# Patient Record
Sex: FEMALE | Race: WHITE | NOT HISPANIC OR LATINO | Employment: FULL TIME | URBAN - METROPOLITAN AREA
[De-identification: names, ages, dates, MRNs, and addresses within clinical notes are randomized per-mention and may not be internally consistent; named-entity substitution may affect disease eponyms.]

---

## 2018-10-25 ENCOUNTER — HISTORICAL (OUTPATIENT)
Dept: RADIOLOGY | Facility: HOSPITAL | Age: 38
End: 2018-10-25

## 2022-04-07 ENCOUNTER — HISTORICAL (OUTPATIENT)
Dept: ADMINISTRATIVE | Facility: HOSPITAL | Age: 42
End: 2022-04-07

## 2022-04-24 VITALS — SYSTOLIC BLOOD PRESSURE: 136 MMHG | DIASTOLIC BLOOD PRESSURE: 83 MMHG

## 2023-01-26 DIAGNOSIS — J32.8 OTHER CHRONIC SINUSITIS: Primary | ICD-10-CM

## 2023-02-16 ENCOUNTER — HOSPITAL ENCOUNTER (OUTPATIENT)
Dept: RADIOLOGY | Facility: HOSPITAL | Age: 43
Discharge: HOME | End: 2023-02-16
Attending: OTOLARYNGOLOGY
Payer: MEDICAID

## 2023-02-16 DIAGNOSIS — J32.8 OTHER CHRONIC SINUSITIS: ICD-10-CM

## 2023-02-16 PROCEDURE — 70486 CT MAXILLOFACIAL W/O DYE: CPT | Mod: TC

## 2023-05-29 DIAGNOSIS — L40.50 ARTHRITIS WITH PSORIASIS: Primary | ICD-10-CM

## 2024-03-19 NOTE — PROGRESS NOTES
"  Patient ID: [id number]     Chief Complaint: psoriasis arthritis (Pt stated generalized joint pain)      Referred By: Paper Order     HPI:     Ena Mallory is a 43 y.o. female here today for a new patient evaluation of PsA.      Presents today for evaluation of PsA, referred by her PCP. Initially diagnosed with Psoriasis in 2002, after sinus surgery, she reports she was having a rash after her surgery and diagnosed after a bx at this time. She has not seen Derm in several years but most recently has been followed by Dr. Allen- last seen roughly 1 year ago. She reports he was managing her skin with topical agents but has never taken any oral or injection medications. She was given Gabapentin, Ultram and Mobic. She has been on Savella in the past as well for Fibro but could not afford to continue to take. She reports her last skin flare was roughly a few weeks ago, lower back and upper buttocks, states this is the first time her skin flares here- previously has been her feet/toes/knees and elbows- She was using Betamethasone topically and did help. She reports a lot of joint pain, mostly her hands/fingers, her knees (L>R)- has had previously arthroscopy on this knee but has continued pain and her left hip is also painful. She reports most recently she has been having more lower back pain, having a lot of pain "locking up" on her. She denies any back injury in the past. She cannot sleep on her back, has to sleep on her right side, occ wakes her up at times. She reports back pain worse with activity. 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 She has taken Ultram up to 4 times a day and is helpful, she states she only takes it once a week, only if she really needs it- not taking it qid.   She reports her knee will get red/warm/swollen at times, she also feels her hip will swell, hands and fingers will also swell. States left side is worse than right side. She " "does report dactylitis in her fingers and toes at times, last occurred a few months ago. Painful to wear shoes when occurs. She does have pain and swelling behind her ankle. Morning stiffness lasting for roughly 1-2 hours. She reports she was getting steroid injections in her left knee, did not help much, relief was very short lived and has not completed in several years. She was previously followed by Ortho in 2009 here at [facility] but cannot recall who and has not seen since.   She reports skin sensitivity when she goes out in the sun- will get sun burned easily but not worsening of rashes, also reports eyes are very sensitive when she goes in the sun. She does report oral ulcers in the past, last occurred a few weeks ago, very painful and has a hard time eating, denies any vaginal or nasal ulcerations, does not occur often. She also has a history of GI issues, has a lot of reflux and nausea, possibly related to NSAID use, takes Prilosec and a Probiotic daily and this helps- is not followed by GI.   She also reports she is really not sure what her "true diagnosis is", knows she was diagnosed with Psoriasis in the past and has continued to have joint pain, states she was originally sent to Rheumatology as they thought she had vasculitis but was ruled out- will try to request records. She states she did have a discussion with Dr. Allen in the past about starting oral meds but never did.     Dr. Allen- Rheum in the past, last seen 1 year ago  [facility]   Dr. Arnold- Cardiology- previously followed by Dr. 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   Family history of autoimmune disease: None.   Pregnancies: 2 Miscarriages: None.   Smoking: Quit 2019, hx of social smoker, started around late 20's- did not smoke " daily     Social History     Tobacco Use   Smoking Status Former    Types: Cigarettes   Smokeless Tobacco Former        ----------------------------  Allergy     Past Surgical History:   Procedure Laterality Date     SECTION      KNEE ARTHROSCOPY W/ ARTHROTOMY Left 2009    SINUS SURGERY  2002    THYROIDECTOMY Left 2012       Review of patient's allergies indicates:   Allergen Reactions    Sulfa (sulfonamide antibiotics) Hives    Adhesive tape-silicones Dermatitis       Current Outpatient Medications   Medication Instructions    buPROPion (WELLBUTRIN SR) 150 mg, Oral, 2 times daily    famotidine (PEPCID) 40 mg, Oral, Daily    gabapentin (NEURONTIN) 600 mg, Oral, 3 times daily    meloxicam (MOBIC) 15 mg, Oral, Daily    metoclopramide HCl (REGLAN) 5 mg, Oral, Before meals & nightly    metoprolol tartrate (LOPRESSOR) 25 mg, Oral, Daily    norethindrone-ethinyl estradiol (JUNEL FE ) 1 mg-20 mcg (21)/75 mg (7) per tablet 1 tablet, Oral, Daily    omeprazole (PRILOSEC) 40 mg, Oral, Daily    traMADoL (ULTRAM) 50 mg, Oral, Every 6 hours       Social History     Socioeconomic History    Marital status:    Tobacco Use    Smoking status: Former     Types: Cigarettes    Smokeless tobacco: Former   Substance and Sexual Activity    Alcohol use: Yes     Comment: occ    Drug use: Not Currently    Sexual activity: Not Currently        Family History   Problem Relation Age of Onset    Chronic back pain Mother     Asthma Mother     Hypertension Father     Heart disease Father     Diabetes Mellitus Father     Diabetes Mellitus Brother     Hypertension Maternal Grandmother     Depression Maternal Grandmother     Cancer Maternal Grandmother     COPD Maternal Grandfather     Cancer Paternal Grandmother         Immunization History   Administered Date(s) Administered    COVID-19 Vaccine 2022    Influenza - Quadrivalent - PF *Preferred* (6 months and older) 2013    Pneumococcal Conjugate - 7 Valent 2013  "   Tdap 11/27/2013       Patient Care Team:  Davi Reyes MD as PCP - General (Family Medicine)     Subjective:     ROS    Constitutional:  Denies chills. Denies fever. Admits occ night sweats. Denies weight loss. Denies sleep disturbance.   Ophthalmology: Denies blurred vision. Denies diminished visual acuity. Admits dry eyes. Denies eye pain. Admits Burning and redness. Denies itching   ENT: Denies decreased hearing. Admits oral ulcers. Denies epistaxis. Denies swelling of ears or throat. Denies dry mouth. Denies swollen glands.   Endocrine: Denies diabetes. Admits thyroid Problems.   Respiratory: Denies cough. Denies shortness of breath. Denies shortness of breath with exertion. Denies hemoptysis.   Cardiovascular: Denies chest pain at rest. Denies chest pain with exertion. Denies Irregular heartbeat. Admits palpitations- hx of PACs- follows with Dr. Arnold. Denies edema. Denies orthopnea.   Gastrointestinal: Denies abdominal pain. Denies diarrhea. Denies nausea. Denies vomiting. Denies hematemesis or hematochezia. Denies change in appetite. Denies heartburn.  Genitourinary: Denies dysuria. Denies urinary frequency. Denies urinary urgency. Denies blood in urine.  Musculoskeletal: See HPI for details  Integumentary: Admits rash. Denies Itching. Denies dry skin. Denies photosensitivity.   Peripheral Vascular: Denies Ulcers of hands and/or feet. Denies Cold extremities.   Neurologic: Denies dizziness. Admits headache. Denies difficulty speaking. Denies loss of strength Denies numbness or tingling.   Psychiatric: Denies depression and anxiety- currently controlled with meds. Denies suicidal/homicidal ideations.      Objective:     Visit Vitals  /80 (BP Location: Right arm, Patient Position: Sitting, BP Method: Medium (Automatic))   Pulse 68   Temp 98.8 °F (37.1 °C) (Oral)   Resp 20   Ht 4' 11" (1.499 m)   Wt 65.5 kg (144 lb 6.4 oz)   SpO2 97%   BMI 29.17 kg/m²       Physical Exam    General Appearance: " "alert, pleasant, in no acute distress.  Skin: Skin color, texture, turgor normal. No rashes or lesions. Spider veins noted to bilateral lower extremities.   Eyes:  extraocular movement intact (EOMI), pupils equal, round, reactive to light and accommodation, conjunctiva clear.  ENT: No oral or nasal ulcers.  Neck:  Neck supple. No adenopathy.   Lungs: CTA bilaterally without crackles, rhonchi, or wheezes.   Heart: RRR w/o murmurs.  No edema. 2+ DP pulse.  Abdomen: Soft, non-tender, no masses, rebound or guarding.  Neuro: Alert, oriented, CN II-XII GI, sensory and motor innervation intact.  Musculoskeletal: Mild synovitis noted to bilateral 2nd/3rd MCPs and several PIPs, FROM with no pain to bilateral wrists, elbows and shoulders, FROM noted to knees with crepitus noted bilaterally (L>R), no pain to bilateral ankles or MTPs, unable to assess nail beds as her nails and toe nails are painted, no dactylitis or enthesitis noted,- SLE bilaterally, tenderness with Alisa on left, right ok, FROM noted to spine  Psych: Alert, oriented, normal eye contact.       Labs Reviewed:     Rheumatology:  No results found for: "ANAHS", "ANATIT", "ANAPAT", "C3", "C4", "DNADSAB", "CCPANTIBODIE", "[id number]", "INTER", "HSCRP", "SEDRATE", "LABURIC"     Chemistry:  No results found for: "NA", "K", "CHLORIDE", "BUN", "CREATININE", "EGFRNORACEVR", "GLUCOSE", "CALCIUM", "ALKPHOS", "LABPROT", "ALBUMIN", "BILIDIR", "IBILI", "AST", "ALT", "MG", "PHOS", "[id number]"     Hematology:  No results found for: "WBC", "HGB", "HCT", "PLT", "NEUTRPER"     Urine:  No results found for: "COLORUA", "APPEARANCEUA", "SGUA", "PHUA", "PROTEINUA", "GLUCOSEUA", "KETONESUA", "BLOODUA", "NITRITESUA", "LEUKOCYTESUR", "RBCUA", "WBCUA", "BACTERIA", "SQEPUA", "HYALINECASTS", "CREATRANDUR", "PROTEINURINE", "UPROTCREA"     No results found for: "PROTEINURINE", "CREATRANDUR", "UPROTCREA"     Imaging:       Assessment:       ICD-10-CM ICD-9-CM   1. Psoriasis  L40.9 696.1   2. " Psoriatic arthritis  L40.50 696.0   3. Bilateral hip pain  M25.551 719.45    M25.552    4. Bilateral foot pain  M79.671 729.5    M79.672    5. Chronic pain of both knees  M25.561 719.46    M25.562 338.29    G89.29    6. Chronic midline low back pain with left-sided sciatica  M54.42 724.2    G89.29 724.3     338.29   7. Bilateral hand pain  M79.641 729.5    M79.642         Plan:     1. Psoriasis/Psoriatic arthritis  Referral here today for evaluation of PsA- initially diagnosed with Psoriasis in 2002 after skin biopsy- was developing rashes after sinus surgery and bx confirmed Psoriasis- she has not seen Derm in several years and was referred to Rheumatology Dr. Allen- has not seen in the past 1 year. She denies taking any meds for Psoriasis besides topicals, no oral or injections. She reports she was being managed with Ultram and Mobic. Today she continues to have pain to bilateral hands, knees, hips and feet- last rash occurred a few weeks ago, first time occurred in lower back/upper buttocks area, previously on knees, feet and elbows- rash of feet noted on phone and Psoriatic in nature. She does endorse a history of Dactylitis and Enthesitis with red/warm/swollen joints and morning stiffness lasting at least 1 hour. Today on exam, mild synovitis noted to 2nd/3rd MCPs bilaterally and several PIPs, no dactylitis or enthesitis noted on exam. No active rashes noted.   - Discussed at length- will request last OV note from Dr. Allen for review  - Further lab and Xrays today for evaluation  - Discussed treatment options including MTX however she is currently on oral birth control- discussed possibly considering Depo or IUD as she should consider stopping oral OCP due to her age and increased risk for stroke- she will reach out to her GYN to discuss further- we can also consider Humira pending lab results  - Infection w/u ordered today  - Ok to continue Mobic as directed however should monitor intake with history of  reflux- also advised that we do not prescribe controlled medication here- she verbalized understanding.   - At length discussion in regards to starting treatment- will request and review last OV note from Dr. Allen as she is unsure of all of her history when she was being followed by him- will review records along with lab and Xrays ordered today    2.  Bilateral hip pain  - Xrays today for further evaluation      4. Bilateral foot pain  - Xrays today for further evaluation     5. Chronic pain of both knees  - Xrays today for further evaluation, possible referral to Ortho in the future     6. Chronic midline low back pain with left-sided sciatica  -  Xrays today for further evaluation     7. Bilateral hand pain  - Xrays today for further evaluation     8. Fibromyalgia  - No issues noted at this time   - She was advised that we do no manage Fibro here, that we will continue to defer treatment to PCP       Follow up in about 8 weeks (around 5/20/2024) for NP Follow Up. In addition to their scheduled follow up, the patient has also been instructed to follow up on as needed basis.       Total time spent with patient and documentation is 75 minutes. All questions were answered to patient's satisfaction and patient verbalized understanding.  Today's visit included increased complexity associated with the care of episodic problem of Psoriasis and PsA addressed and managing the longitudinal care of the patient due to the series and or complex managed problem(s).       CHAUNCEY Oliveros    Orders Placed This Encounter   Procedures    X-Ray Knee 1 or 2 View Left     Standing Status:   Future     Standing Expiration Date:   3/25/2025     Order Specific Question:   May the Radiologist modify the order per protocol to meet the clinical needs of the patient?     Answer:   Yes     Order Specific Question:   Release to patient     Answer:   Immediate    X-Ray Knee 1 or 2 View Right     Standing Status:   Future      Standing Expiration Date:   3/25/2025     Order Specific Question:   May the Radiologist modify the order per protocol to meet the clinical needs of the patient?     Answer:   Yes     Order Specific Question:   Release to patient     Answer:   Immediate    X-Ray Hip 2 or 3 views Left (with Pelvis when performed)     Standing Status:   Future     Standing Expiration Date:   3/25/2025     Order Specific Question:   May the Radiologist modify the order per protocol to meet the clinical needs of the patient?     Answer:   Yes     Order Specific Question:   Release to patient     Answer:   Immediate    X-Ray Hip 2 or 3 views Right (with Pelvis when performed)     Standing Status:   Future     Standing Expiration Date:   3/25/2025     Order Specific Question:   May the Radiologist modify the order per protocol to meet the clinical needs of the patient?     Answer:   Yes     Order Specific Question:   Release to patient     Answer:   Immediate    X-Ray Lumbar Complete Including Flex And Ext     Standing Status:   Future     Standing Expiration Date:   3/25/2025     Order Specific Question:   May the Radiologist modify the order per protocol to meet the clinical needs of the patient?     Answer:   Yes    X-Ray Sacroiliac Joints 3 Views     Standing Status:   Future     Standing Expiration Date:   3/25/2025     Order Specific Question:   May the Radiologist modify the order per protocol to meet the clinical needs of the patient?     Answer:   Yes     Order Specific Question:   Release to patient     Answer:   Immediate    X-Ray Hand 3 view Left     Standing Status:   Future     Standing Expiration Date:   3/25/2025     Order Specific Question:   May the Radiologist modify the order per protocol to meet the clinical needs of the patient?     Answer:   Yes     Order Specific Question:   Release to patient     Answer:   Immediate    X-Ray Hand 3 view Right     Standing Status:   Future     Standing Expiration Date:   3/25/2025      Order Specific Question:   May the Radiologist modify the order per protocol to meet the clinical needs of the patient?     Answer:   Yes     Order Specific Question:   Release to patient     Answer:   Immediate    X-Ray Foot Complete Right     Standing Status:   Future     Standing Expiration Date:   3/25/2025     Order Specific Question:   May the Radiologist modify the order per protocol to meet the clinical needs of the patient?     Answer:   Yes     Order Specific Question:   Release to patient     Answer:   Immediate    X-Ray Foot Complete Left     Standing Status:   Future     Standing Expiration Date:   3/25/2025     Order Specific Question:   May the Radiologist modify the order per protocol to meet the clinical needs of the patient?     Answer:   Yes     Order Specific Question:   Release to patient     Answer:   Immediate    X-Ray Chest PA And Lateral     Standing Status:   Future     Standing Expiration Date:   3/25/2025     Order Specific Question:   May the Radiologist modify the order per protocol to meet the clinical needs of the patient?     Answer:   Yes     Order Specific Question:   Release to patient     Answer:   Immediate    CBC Auto Differential     Standing Status:   Future     Number of Occurrences:   1     Standing Expiration Date:   5/24/2025    Comprehensive Metabolic Panel     Standing Status:   Future     Number of Occurrences:   1     Standing Expiration Date:   5/24/2025    C-Reactive Protein     Standing Status:   Future     Number of Occurrences:   1     Standing Expiration Date:   5/24/2025    Sedimentation rate     Standing Status:   Future     Number of Occurrences:   1     Standing Expiration Date:   5/24/2025    HLA B27 Antigen     Standing Status:   Future     Number of Occurrences:   1     Standing Expiration Date:   5/24/2025    SANTA IgG by IFA     Standing Status:   Future     Number of Occurrences:   1     Standing Expiration Date:   5/24/2025    Rheumatoid Factors,  IgA, IgG, IgM     Standing Status:   Future     Number of Occurrences:   1     Standing Expiration Date:   5/25/2025    Miscellaneous Test, Sendout Anti CCP, please send to [facility] for quantitative result.     Standing Status:   Future     Number of Occurrences:   1     Standing Expiration Date:   5/25/2025     Order Specific Question:   What is the name of the test you wish to perform? (Note: Please provide the reference lab test code if possible. If you have any questions, call the Lab.)     Answer:   Anti CCP, please send to [facility] for quantitative result.    HIV 1/2 Ag/Ab (4th Gen)     Standing Status:   Future     Number of Occurrences:   1     Standing Expiration Date:   5/24/2025     Order Specific Question:   Release to patient     Answer:   Immediate    Quantiferon Gold TB     Standing Status:   Future     Number of Occurrences:   1     Standing Expiration Date:   5/24/2025    Hepatitis C Antibody     Standing Status:   Future     Number of Occurrences:   1     Standing Expiration Date:   5/24/2025     Order Specific Question:   Release to patient     Answer:   Immediate    Hepatitis B Surface Antigen     Standing Status:   Future     Number of Occurrences:   1     Standing Expiration Date:   5/24/2025    Hepatitis B Core Antibody, Total     Standing Status:   Future     Number of Occurrences:   1     Standing Expiration Date:   5/24/2025    Hepatitis B Surface Ab, Qualitative     Standing Status:   Future     Number of Occurrences:   1     Standing Expiration Date:   5/24/2025

## 2024-03-25 ENCOUNTER — HOSPITAL ENCOUNTER (OUTPATIENT)
Dept: RADIOLOGY | Facility: HOSPITAL | Age: 44
Discharge: HOME | End: 2024-03-25
Attending: NURSE PRACTITIONER
Payer: MEDICAID

## 2024-03-25 ENCOUNTER — TELEPHONE (OUTPATIENT)
Dept: RHEUMATOLOGY | Facility: CLINIC | Age: 44
End: 2024-03-25

## 2024-03-25 ENCOUNTER — OFFICE VISIT (OUTPATIENT)
Dept: RHEUMATOLOGY | Facility: CLINIC | Age: 44
End: 2024-03-25
Payer: MEDICAID

## 2024-03-25 VITALS
SYSTOLIC BLOOD PRESSURE: 114 MMHG | RESPIRATION RATE: 20 BRPM | OXYGEN SATURATION: 97 % | HEART RATE: 68 BPM | BODY MASS INDEX: 29.11 KG/M2 | HEIGHT: 59 IN | DIASTOLIC BLOOD PRESSURE: 80 MMHG | TEMPERATURE: 99 F | WEIGHT: 144.38 LBS

## 2024-03-25 DIAGNOSIS — M79.641 BILATERAL HAND PAIN: ICD-10-CM

## 2024-03-25 DIAGNOSIS — M25.561 CHRONIC PAIN OF BOTH KNEES: ICD-10-CM

## 2024-03-25 DIAGNOSIS — M79.642 BILATERAL HAND PAIN: ICD-10-CM

## 2024-03-25 DIAGNOSIS — M79.671 BILATERAL FOOT PAIN: ICD-10-CM

## 2024-03-25 DIAGNOSIS — M25.552 BILATERAL HIP PAIN: ICD-10-CM

## 2024-03-25 DIAGNOSIS — L40.9 PSORIASIS: ICD-10-CM

## 2024-03-25 DIAGNOSIS — G89.29 CHRONIC PAIN OF BOTH KNEES: ICD-10-CM

## 2024-03-25 DIAGNOSIS — L40.9 PSORIASIS: Primary | ICD-10-CM

## 2024-03-25 DIAGNOSIS — M25.551 BILATERAL HIP PAIN: ICD-10-CM

## 2024-03-25 DIAGNOSIS — M79.672 BILATERAL FOOT PAIN: ICD-10-CM

## 2024-03-25 DIAGNOSIS — M54.42 CHRONIC MIDLINE LOW BACK PAIN WITH LEFT-SIDED SCIATICA: ICD-10-CM

## 2024-03-25 DIAGNOSIS — M25.562 CHRONIC PAIN OF BOTH KNEES: ICD-10-CM

## 2024-03-25 DIAGNOSIS — G89.29 CHRONIC MIDLINE LOW BACK PAIN WITH LEFT-SIDED SCIATICA: ICD-10-CM

## 2024-03-25 DIAGNOSIS — L40.50 PSORIATIC ARTHRITIS: ICD-10-CM

## 2024-03-25 PROBLEM — F32.A DEPRESSIVE DISORDER: Status: ACTIVE | Noted: 2024-03-25

## 2024-03-25 PROBLEM — K21.9 GASTROESOPHAGEAL REFLUX DISEASE: Status: ACTIVE | Noted: 2024-03-25

## 2024-03-25 PROBLEM — I10 HYPERTENSION: Status: ACTIVE | Noted: 2024-03-25

## 2024-03-25 PROBLEM — E03.9 HYPOTHYROIDISM: Status: ACTIVE | Noted: 2024-03-25

## 2024-03-25 PROBLEM — M79.7 FIBROMYALGIA: Status: ACTIVE | Noted: 2024-03-25

## 2024-03-25 PROBLEM — J45.909 ASTHMA: Status: ACTIVE | Noted: 2024-03-25

## 2024-03-25 PROCEDURE — 73630 X-RAY EXAM OF FOOT: CPT | Mod: TC,LT

## 2024-03-25 PROCEDURE — 71046 X-RAY EXAM CHEST 2 VIEWS: CPT | Mod: TC

## 2024-03-25 PROCEDURE — 73502 X-RAY EXAM HIP UNI 2-3 VIEWS: CPT | Mod: TC,RT

## 2024-03-25 PROCEDURE — 3008F BODY MASS INDEX DOCD: CPT | Mod: CPTII,,, | Performed by: NURSE PRACTITIONER

## 2024-03-25 PROCEDURE — 73560 X-RAY EXAM OF KNEE 1 OR 2: CPT | Mod: TC,RT

## 2024-03-25 PROCEDURE — 3079F DIAST BP 80-89 MM HG: CPT | Mod: CPTII,,, | Performed by: NURSE PRACTITIONER

## 2024-03-25 PROCEDURE — 73630 X-RAY EXAM OF FOOT: CPT | Mod: TC,RT

## 2024-03-25 PROCEDURE — 73130 X-RAY EXAM OF HAND: CPT | Mod: TC,RT

## 2024-03-25 PROCEDURE — 72114 X-RAY EXAM L-S SPINE BENDING: CPT | Mod: TC

## 2024-03-25 PROCEDURE — 73502 X-RAY EXAM HIP UNI 2-3 VIEWS: CPT | Mod: TC,LT

## 2024-03-25 PROCEDURE — 73560 X-RAY EXAM OF KNEE 1 OR 2: CPT | Mod: TC,LT

## 2024-03-25 PROCEDURE — 99215 OFFICE O/P EST HI 40 MIN: CPT | Mod: S$PBB,,, | Performed by: NURSE PRACTITIONER

## 2024-03-25 PROCEDURE — 99215 OFFICE O/P EST HI 40 MIN: CPT | Mod: PBBFAC,25 | Performed by: NURSE PRACTITIONER

## 2024-03-25 PROCEDURE — 1159F MED LIST DOCD IN RCRD: CPT | Mod: CPTII,,, | Performed by: NURSE PRACTITIONER

## 2024-03-25 PROCEDURE — 3074F SYST BP LT 130 MM HG: CPT | Mod: CPTII,,, | Performed by: NURSE PRACTITIONER

## 2024-03-25 PROCEDURE — 73130 X-RAY EXAM OF HAND: CPT | Mod: TC,LT

## 2024-03-25 PROCEDURE — G2211 COMPLEX E/M VISIT ADD ON: HCPCS | Mod: S$PBB,,, | Performed by: NURSE PRACTITIONER

## 2024-03-25 PROCEDURE — 1160F RVW MEDS BY RX/DR IN RCRD: CPT | Mod: CPTII,,, | Performed by: NURSE PRACTITIONER

## 2024-03-25 PROCEDURE — 72200 X-RAY EXAM SI JOINTS: CPT | Mod: TC

## 2024-03-25 RX ORDER — TRAMADOL HYDROCHLORIDE 50 MG/1
50 TABLET ORAL EVERY 6 HOURS
COMMUNITY

## 2024-03-25 RX ORDER — METOPROLOL TARTRATE 25 MG/1
25 TABLET, FILM COATED ORAL DAILY
COMMUNITY

## 2024-03-25 RX ORDER — BUPROPION HYDROCHLORIDE 150 MG/1
150 TABLET, EXTENDED RELEASE ORAL 2 TIMES DAILY
COMMUNITY

## 2024-03-25 RX ORDER — OMEPRAZOLE 40 MG/1
40 CAPSULE, DELAYED RELEASE ORAL DAILY
COMMUNITY

## 2024-03-25 RX ORDER — GABAPENTIN 600 MG/1
600 TABLET ORAL 3 TIMES DAILY
COMMUNITY

## 2024-03-25 RX ORDER — METOCLOPRAMIDE 5 MG/1
5 TABLET ORAL
COMMUNITY

## 2024-03-25 RX ORDER — NORETHINDRONE ACETATE AND ETHINYL ESTRADIOL 1MG-20(21)
1 KIT ORAL DAILY
COMMUNITY
End: 2024-04-30

## 2024-03-25 RX ORDER — FAMOTIDINE 40 MG/1
40 TABLET, FILM COATED ORAL DAILY
COMMUNITY

## 2024-03-25 RX ORDER — MELOXICAM 15 MG/1
15 TABLET ORAL DAILY
COMMUNITY

## 2024-03-25 NOTE — TELEPHONE ENCOUNTER
Dr Allen office faxed machine not working there office suggested I mail. Request for last office visit mailed to [address] [place] [address] [phone] jeimy

## 2024-04-01 ENCOUNTER — TELEPHONE (OUTPATIENT)
Dept: RHEUMATOLOGY | Facility: CLINIC | Age: 44
End: 2024-04-01
Payer: MEDICAID

## 2024-04-01 DIAGNOSIS — G89.29 CHRONIC MIDLINE LOW BACK PAIN WITH LEFT-SIDED SCIATICA: Primary | ICD-10-CM

## 2024-04-01 DIAGNOSIS — M54.42 CHRONIC MIDLINE LOW BACK PAIN WITH LEFT-SIDED SCIATICA: Primary | ICD-10-CM

## 2024-04-01 NOTE — TELEPHONE ENCOUNTER
4/02/24 spoke to pt inform pt of message.pt verbalized understanding.stated she opened to during PT                    I attempt to call pt no answer left a voicemail message to call clinic back regarding lab beatrices              ----- Message from CHAUNCEY Oliveros sent at 3/28/2024  7:52 AM CDT -----  Please advise the patient the following lab and Xray results: all lab clinically acceptable, SANTA/CCP and Rheumatoid factor all negative, remaining lab clinically acceptable. Xrays as follows, Chest Xray ok, left and right foot, left and right hands and knees and SI joint Xrays all with no abnormalities noted. Lspine with degenerative changes noted, in the future if back continues to bother her she can consider PT. Please let her know we are still waiting on records from Dr. Allen's office- if we can check on the status if they have sent them and keep me posted. If we do not receive records from [facility] in the next week, please let her know we will reach out to her to set up a follow apt to discuss treatment options

## 2024-04-10 ENCOUNTER — TELEPHONE (OUTPATIENT)
Dept: RHEUMATOLOGY | Facility: CLINIC | Age: 44
End: 2024-04-10
Payer: MEDICAID

## 2024-04-10 NOTE — TELEPHONE ENCOUNTER
----- Message from CHAUNCEY Oliveros sent at 4/10/2024 12:09 PM CDT -----  Ms MOLLY, can you reach out directly to Dr. Vazquez office and speak with one of the nurses to see if they can help us interpret what she was being followed for there. I'm having a hard time making out the hand written notes and did not make out anything pertaining to Rheum from what I could read- it may be in a previous note if they can provide feedback for us. Please keep me posted.   ----- Message -----  From: Samantha Celaya LPN  Sent: 4/10/2024   8:11 AM CDT  To: CHAUNCEY Oliveros

## 2024-04-11 ENCOUNTER — TELEPHONE (OUTPATIENT)
Dept: RHEUMATOLOGY | Facility: CLINIC | Age: 44
End: 2024-04-11
Payer: MEDICAID

## 2024-04-11 NOTE — TELEPHONE ENCOUNTER
Please reach out to Ena, we did receive her records from Dr. Allen's office- last OV was treating for Fibromyalgia - please find out if she has discussed with her GYN yet the change of her oral birth control to another agent- we reviewed and discussed at her recent visit-  we briefly reviewed starting MTX but as she is on OCP's so we will avoid starting as she is of child bearing age- we discussed Depo/IUD, etc. If she defers wanting to change please keep me updated- we can also consider staring Humira instead.

## 2024-04-12 NOTE — TELEPHONE ENCOUNTER
Spoke to pt inform her of message. Pt. stated she was still taking oral birth control because she has not heard from us but will get with her GYN doctor to switch to depo Instructed when she starts Depo to please call me back. Pt verbalized understanding

## 2024-04-17 ENCOUNTER — TELEPHONE (OUTPATIENT)
Dept: RHEUMATOLOGY | Facility: CLINIC | Age: 44
End: 2024-04-17
Payer: MEDICAID

## 2024-04-18 ENCOUNTER — TELEPHONE (OUTPATIENT)
Dept: RHEUMATOLOGY | Facility: CLINIC | Age: 44
End: 2024-04-18
Payer: MEDICAID

## 2024-04-18 NOTE — TELEPHONE ENCOUNTER
Pt accept apt  for April 30 th @ 7:30                    Spoke to pt informed of message. Pt verbalized understanding . Pt stated she taking her first Depo shoot this morning at [facility] to day  04/18/24                  ----- Message from CHAUNCEY Oliveros sent at 4/18/2024  7:45 AM CDT -----  Please let Ena know that she should not need an order in regards to changing her birth control- this is a discussion between her and GYN. Several factors in regards to starting MTX including the fact that it is teratogenic- oral birth control has to be taken everyday around the same time, if she misses any doses or takes antibiotics for any reason this will decrease the efficacy of the pills.  Before initiating this medication we make sure our female patients are either on Depo or have an IUD- if she defers wanting to change from oral birth control we can consider starting another medication such as Humira injections- this would be up to Ena to decide and let us know which she prefers. (MTX is taken once a week, Humira is an injection q14 days). Please keep me posted in regards to what she would like to do, otherwise her GYN should not require an order from us in regards to changing her birth control.  ----- Message -----  From: Samantha Celaya LPN  Sent: 4/18/2024   7:16 AM CDT  To: CHAUNCEY Oliveros      ----- Message -----  From: Nguyen Cabezas LPN  Sent: 4/17/2024   3:30 PM CDT  To: Samantha Celaya LPN      ----- Message -----  From: Susana Barajas  Sent: 4/17/2024   1:15 PM CDT  To: [facility] Rheumatology Clinical Support Staff    Pt called bc her OBYN Margie Woodson is needing an order that says she needs to switch Birth control to depo to start the medication methotrexate.   Fax [phone]

## 2024-04-25 NOTE — PROGRESS NOTES
"  Patient ID: [id number]     Chief Complaint: Psoriasis (Pt stated psoriasis has improved under lt foot. C/o pain to the back)    HPI:     Ena Mallory is a 43 y.o. female here today for a follow up of PsA.      Presents today for evaluation of PsA, referred by her PCP. Initially diagnosed with Psoriasis in 2002, after sinus surgery, she reports she was having a rash after her surgery and diagnosed after a bx at this time. She has not seen Derm in several years but most recently has been followed by Dr. Allen- last seen roughly 1 year ago. She reports he was managing her skin with topical agents but has never taken any oral or injection medications. She was given Gabapentin, Ultram and Mobic. She has been on Savella in the past as well for Fibro but could not afford to continue to take. She reports her last skin flare was roughly a few weeks ago, lower back and upper buttocks, states this is the first time her skin flares here- previously has been her feet/toes/knees and elbows- She was using Betamethasone topically and did help. She reports a lot of joint pain, mostly her hands/fingers, her knees (L>R)- has had previously arthroscopy on this knee but has continued pain and her left hip is also painful. She reports most recently she has been having more lower back pain, having a lot of pain "locking up" on her. She denies any back injury in the past. She cannot sleep on her back, has to sleep on her right side, occ wakes her up at times. She reports back pain worse with activity. 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 She has taken Ultram up to 4 times a day and is helpful, she states she only takes it once a week, only if she really needs it- not taking it qid.   She reports her knee will get red/warm/swollen at times, she also feels her hip will swell, hands and fingers will also swell. States left side is worse than right side. She does report " dactylitis in her fingers and toes at times, last occurred a few months ago. Painful to wear shoes when occurs. She does have pain and swelling behind her ankles. Morning stiffness lasting for roughly 1-2 hours. She reports she was getting steroid injections in her left knee, did not help much, relief was very short lived and has not completed in several years. She was previously followed by Ortho in 2009 here at [facility] and has not seen since.   She reports skin sensitivity when she goes out in the sun- will get sun burned easily but not worsening of rashes, also reports eyes are very sensitive when she goes in the sun. She does report oral ulcers in the past, last occurred a few weeks ago, very painful and has a hard time eating, denies any vaginal or nasal ulcerations, does not occur often. She also has a history of GI issues, has a lot of reflux and nausea, possibly related to NSAID use, takes Prilosec and a Probiotic daily and this helps- is not followed by GI. Denies bloody stools but does have a lot of abd bloating and gas.      April 2024: Here today for follow up. Received records from Dr. Allen- he was managing his Fibromyalgia, previous discussion in regards to oral meds for Psoriasis but never started.   Since her last visit she has d/c oral birth control and was started on Depo as we did discuss MTX as a treatment option- she is here today to discuss further. The rashes on her feet are getting better, she has been using topical and trying to keep very moisturized and improving, denies any new rashes She did have an area on her back that started to come out but started topicals quickly and controlled. She continues to have left knee pain, states will get red/warm/swollen, otherwise she has not had any other red/warm/swollen joints since her last visit. Her toes will bother her off an on during the week- does report she had an episode of dactylitis since her last visit. She continues to have back pain,  chronic, depends on her activity- just started PT. Morning stiffness lasting roughly 2 hours. She also suffers with headaches- she was previously followed by Neurology- last seen roughly 9 years ago, at this time they did discuss Biotox but she states she lost her insurance and was not able to follow back up- headaches several times a week (at least 2-3)- Excedrin Migraine does help- occ will have to take Ibuprofen after.  Does not have regular cycles anymore so not associated. Mother with history of Migraines.     Dr. Allen- Rheum in the past, last seen 1 year ago  [facility]   Dr. Arnold- Cardiology- previously followed by Dr. 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   Family history of autoimmune disease: None.   Pregnancies: 2 Miscarriages: None.   Smoking: Quit , hx of social smoker, started around late - did not smoke daily     Social History     Tobacco Use   Smoking Status Former    Types: Cigarettes   Smokeless Tobacco Former        -------------------------------------    Allergy        Past Surgical History:   Procedure Laterality Date     SECTION      KNEE ARTHROSCOPY W/ ARTHROTOMY Left     SINUS SURGERY  2002    THYROIDECTOMY Left     Umbical Hernia Repair N/A 2017       Review of patient's allergies indicates:   Allergen Reactions    Sulfa (sulfonamide antibiotics) Hives    Adhesive tape-silicones Dermatitis and Rash       Current Outpatient Medications   Medication Instructions    buPROPion (WELLBUTRIN SR) 150 mg, Oral, 2 times daily    famotidine (PEPCID) 40 mg, Oral, Daily    folic acid (FOLVITE) 1 mg, Oral, Daily, After food    gabapentin (NEURONTIN) 600 mg, Oral, 3 times daily    medroxyPROGESTERone (DEPO-PROVERA) 150 mg, Intramuscular, Every 3 months     meloxicam (MOBIC) 15 mg, Oral, Daily    methotrexate 2.5 MG Tab Take 4 tablets once a week for 2 weeks and increase dose to 6 tablets once a week. (Ok to split the dose and take 3 tab in the am and 3 tab in the pm). Hold for infection or fever.    metoclopramide HCl (REGLAN) 5 mg, Oral, Before meals & nightly    metoprolol tartrate (LOPRESSOR) 25 mg, Oral, Daily    omeprazole (PRILOSEC) 40 mg, Oral, Daily    traMADoL (ULTRAM) 50 mg, Oral, Every 6 hours       Social History     Socioeconomic History    Marital status:    Tobacco Use    Smoking status: Former     Types: Cigarettes    Smokeless tobacco: Former   Substance and Sexual Activity    Alcohol use: Yes     Comment: occ    Drug use: Not Currently    Sexual activity: Not Currently     Birth control/protection: Injection        Family History   Problem Relation Name Age of Onset    Chronic back pain Mother      Asthma Mother      Hypertension Father      Heart disease Father      Diabetes Mellitus Father      Diabetes Mellitus Brother      Hypertension Maternal Grandmother      Depression Maternal Grandmother      Cancer Maternal Grandmother      COPD Maternal Grandfather      Cancer Paternal Grandmother          Immunization History   Administered Date(s) Administered    COVID-19 Vaccine 03/12/2022    Influenza - Quadrivalent - PF *Preferred* (6 months and older) 11/27/2013    Pneumococcal Conjugate - 7 Valent 11/27/2013    Tdap 11/27/2013       Patient Care Team:  Davi Reyes MD as PCP - General (Family Medicine)     Subjective:     ROS    Constitutional:  Denies chills. Denies fever. Admits occ night sweats. Denies weight loss. Denies sleep disturbance.   Ophthalmology: Denies blurred vision. Denies diminished visual acuity. Admits dry eyes. Denies eye pain. Admits Burning and redness- not at this time- occurred in the past. Denies itching   ENT: Denies decreased hearing. Admits oral ulcers. Denies epistaxis. Denies swelling of ears or throat.  "Denies dry mouth. Denies swollen glands.   Endocrine: Denies diabetes. Admits thyroid Problems.   Respiratory: Denies cough. Denies shortness of breath. Denies shortness of breath with exertion. Denies hemoptysis.   Cardiovascular: Denies chest pain at rest. Denies chest pain with exertion. Denies Irregular heartbeat. Admits palpitations- hx of PACs- follows with Dr. Arnold. Denies edema. Denies orthopnea.   Gastrointestinal: Denies abdominal pain. Denies diarrhea. Denies nausea. Denies vomiting. Denies hematemesis or hematochezia. Denies change in appetite. Admits heartburn and bloating.   Genitourinary: Denies dysuria. Denies urinary frequency. Denies urinary urgency. Denies blood in urine.  Musculoskeletal: See HPI for details  Integumentary: Admits rash. Denies Itching. Denies dry skin. Denies photosensitivity.   Peripheral Vascular: Denies Ulcers of hands and/or feet. Denies Cold extremities.   Neurologic: Denies dizziness. Admits headache. Denies difficulty speaking. Denies loss of strength Denies numbness or tingling.   Psychiatric: Denies depression and anxiety- currently controlled with meds. Denies suicidal/homicidal ideations.      Objective:     Visit Vitals  /80 (BP Location: Left arm, Patient Position: Sitting, BP Method: Medium (Automatic))   Pulse 68   Temp 98 °F (36.7 °C) (Oral)   Resp 20   Ht 4' 11" (1.499 m)   Wt 66.4 kg (146 lb 6.2 oz)   SpO2 100%   BMI 29.57 kg/m²         Physical Exam    General Appearance: alert, pleasant, in no acute distress.  Skin: Skin color, texture, turgor normal. No rashes or lesions. Spider veins noted to bilateral lower extremities.   Eyes:  extraocular movement intact (EOMI), pupils equal, round, reactive to light and accommodation, conjunctiva clear.  ENT: No oral or nasal ulcers.  Neck:  Neck supple. No adenopathy.   Lungs: CTA bilaterally without crackles, rhonchi, or wheezes.   Heart: RRR w/o murmurs.  No edema. 2+ DP pulse.  Abdomen: Soft, non-tender, no " "masses, rebound or guarding.  Neuro: Alert, oriented, CN II-XII GI, sensory and motor innervation intact.  Musculoskeletal: Synovitis noted to several PIPs, FROM with no pain to bilateral wrists, elbows and shoulders, FROM noted to knees with crepitus noted bilaterally (L>R), mild tenderness to bilateral ankles and MTPs, unable to assess nail beds as her nails and toe nails are painted, no dactylitis or enthesitis noted   Psych: Alert, oriented, normal eye contact.       Labs Reviewed:   1/5/2023 Vitamin D 20.1.CMP ok, CBC ok.     3/25/2024 Hep B Surface Ab Reactive, CMP ok. CBC ok. Hep B surface Ag, Core Ab/C/HIV all negative, CRP 4.50 (<5), Sed Rate 2 (<20). HLA B27 negative. SANTA negative. CCP negative. RF negative, Quantiferon negative     Rheumatology:  Lab Results   Component Value Date    ANAHS <1:80 (Negative) 03/25/2024    [id number] Negative 03/25/2024    INTER SEE COMMENTS 03/25/2024    SEDRATE 2 03/25/2024        Chemistry:  Lab Results   Component Value Date     03/25/2024    K 3.8 03/25/2024    CHLORIDE 110 (H) 03/25/2024    BUN 15.8 03/25/2024    CREATININE 1.02 03/25/2024    EGFRNORACEVR >60 03/25/2024    GLUCOSE 78 03/25/2024    CALCIUM 8.9 03/25/2024    ALKPHOS 50 03/25/2024    LABPROT 7.3 03/25/2024    ALBUMIN 3.9 03/25/2024    AST 19 03/25/2024    ALT 15 03/25/2024        Hematology:  Lab Results   Component Value Date    WBC 4.50 03/25/2024    HGB 12.4 03/25/2024    HCT 38.3 03/25/2024     03/25/2024        Urine:  No results found for: "COLORUA", "APPEARANCEUA", "SGUA", "PHUA", "PROTEINUA", "GLUCOSEUA", "KETONESUA", "BLOODUA", "NITRITESUA", "LEUKOCYTESUR", "RBCUA", "WBCUA", "BACTERIA", "SQEPUA", "HYALINECASTS", "CREATRANDUR", "PROTEINURINE", "UPROTCREA"     No results found for: "PROTEINURINE", "CREATRANDUR", "UPROTCREA"     Imaging:   3/25/2024 CXRay: Impression: No radiographic evidence of an acute cardiopulmonary process.  Left and Right Foot Xray: Impression: No acute " abnormalities are seen  Left and Right hand Xray: Impression: No acute abnormalities are seen  Left and Right Knee Xray: Impression:  No acute abnormalities are seen  Left and Right Hip Xray: Impression: No acute osseous abnormality.  Lspine: FINDINGS: Degenerative changes are identified with narrowing and some sclerosis and marginal osteophytosis at L2-L3 some degenerative changes seen of the posterior elements at L4-L5 and L5-S1. Impression: Degenerative changes. No clear evidence of instability  SI Joint Xray: Impression:  No acute osseous abnormality.      Assessment:       ICD-10-CM ICD-9-CM   1. Psoriatic arthritis  L40.50 696.0   2. Psoriasis  L40.9 696.1   3. High risk medication use  Z79.899 V58.69   4. Drug-induced immunodeficiency  D84.821 279.3    Z79.899 E947.9   5. Chronic midline low back pain with left-sided sciatica  M54.42 724.2    G89.29 724.3     338.29   6. Postoperative hypothyroidism  E89.0 244.0   7. Gastroesophageal reflux disease without esophagitis  K21.9 530.81   8. Migraine with aura and without status migrainosus, not intractable  G43.109 346.00          Plan:     1. Psoriasis/Psoriatic arthritis  Diagnosed with Psoriasis in 2002 after skin biopsy- was developing rashes after sinus surgery and bx confirmed Psoriasis- she has not seen Derm in several years and was referred to Rheumatology Dr. Allen- has not seen in the past 1 year. She denies taking any meds for Psoriasis besides topicals, no orals or injections. She reports she was being managed with Ultram and Mobic. She gives a history of pain in her hands, knees, hips and toes with morning stiffness lasting 2 hours, also gives a history of dactylitis and enthesitis in the past. Last rash occurred a few weeks ago, first time occurred in lower back/upper buttocks area, previously on knees, feet and elbows- rash of feet noted on phone and Psoriatic in nature.  Today on exam, mild synovitis noted to several PIPs bilaterally, no  dactylitis, enthesitis and pain to several MTPs noted on exam. No active rashes noted today.   - Start MTX as directed below- repeat lab in 4 weeks- orders placed- no refills on meds, advised this is her reminder lab work is due  - She recently stopped oral birth control and started on Depo with her GYN- last injection 2 weeks ago (roughly 4/15/2024)  - Infection w/u negative 4/2024  - Cxray ok 4/2024  - Ok to continue Mobic as directed however should monitor intake with history of reflux- will consider referral to GI in the future- defers at this time    -Discussed risks and benefits of Methotrexate (MTX) in detail. MTX is an immunosuppressant medication.  When used in high doses (such as in cancer), it may have many side effects.  The doses that are used in rheumatological disorders are much lower.  Side effects were explained to the patient, including kidney and liver damage, bone marrow suppression, increased infection risk, mouth sores, hair loss, nausea, GI symptoms.   Start Methotrexate at 10 mg per week x 2 weeks and increase to 15 mg po qweek   Start folic 1mg daily to prevent some of the side effects of methotrexate.  Labwork: CBC and a CMP should be checked at baseline, monthly for the first few months and every 3 months afterwards.  Check a Hepatitis Panel and a Chest X-Ray prior to initiation of methotrexate.  Patient to call with any concerns and adverse effects of MTX.  -Methotrexate is also teratogenic, so birth control is needed while on this medication if applicable.  Counselled on limiting alcohol use to 1-2 drinks/week while on methotrexate given potential liver toxicity.    2. High Risk Med use/Drug Induced Immunodeficency  - Persons with rheumatoid arthritis, lupus, psoriatic arthritis and other autoimmune diseases are at increased risk of cardiovascular disease including heart attack and stroke. We recommend that all patients with these conditions have annual health maintenance exams including  lipid measurements, blood pressure measurements, and smoking cessation counseling when applicable at their primary care provider's office.  - Advised to stay up-to-date on age appropriate vaccinations and malignancy screening with PCP.   - Continued lab monitoring.      3.  Bilateral hip/foot/knee/hand pain  - Xrays reviewed from April 2024- all noted to be ok- will start treatment as mentioned above    4. Chronic midline low back pain with left-sided sciatica  - April 2024 Lspine: FINDINGS: Degenerative changes are identified with narrowing and some sclerosis and marginal osteophytosis at L2-L3 some degenerative changes seen of the posterior elements at L4-L5 and L5-S1. Impression: Degenerative changes. No clear evidence of instability  - Enc stretches, started PT last week- has attended 1 session so far    5. Fibromyalgia  - No issues noted at this time   - She was advised that we do no manage Fibro here, that we will continue to defer treatment to PCP     6. Hypothyroidism   - Followed and managed by PCP  - Diet modifications and enc to cut back on use of NSAIDs- she defers GI referral at this time, will consider in the future.     7. Migraines  - History of Migraines for several years- was previously followed by Neurology (cannot recall name)- discussed possible Botox but lost her insurance and was not able to follow up- has been managing with Excedrin Migraine and Motrin/Aleve- reports light sensitivity, has to lay down in dark room with no lights, nausea, occ vomiting and dizziness associated with headaches- occurs roughly 2-3 times a week- OTC meds do help some days- would like referral back to Neurology- possibly to help decrease use of NSAIDs with treatment  - Referral sent to Neurology    Follow up in about 3 months (around 7/30/2024) for NP Follow Up. In addition to their scheduled follow up, the patient has also been instructed to follow up on as needed basis.       Total time spent with patient and  documentation is 45 minutes. All questions were answered to patient's satisfaction and patient verbalized understanding. This includes face to face time and non-face to face time preparing to see the patient (eg, review of tests), obtaining and/or reviewing separately obtained history, documenting clinical information in the electronic or other health record, independently interpreting results and communicating results to the patient/family/caregiver, or care coordinator.      CHAUNCEY Oliveros    Orders Placed This Encounter   Procedures    CBC Auto Differential     Standing Status:   Future     Standing Expiration Date:   5/30/2024    Comprehensive Metabolic Panel     Standing Status:   Future     Standing Expiration Date:   5/30/2024

## 2024-04-30 ENCOUNTER — OFFICE VISIT (OUTPATIENT)
Dept: RHEUMATOLOGY | Facility: CLINIC | Age: 44
End: 2024-04-30
Payer: MEDICAID

## 2024-04-30 VITALS
RESPIRATION RATE: 20 BRPM | SYSTOLIC BLOOD PRESSURE: 126 MMHG | BODY MASS INDEX: 29.51 KG/M2 | OXYGEN SATURATION: 100 % | WEIGHT: 146.38 LBS | TEMPERATURE: 98 F | DIASTOLIC BLOOD PRESSURE: 80 MMHG | HEIGHT: 59 IN | HEART RATE: 68 BPM

## 2024-04-30 DIAGNOSIS — L40.50 PSORIATIC ARTHRITIS: Primary | ICD-10-CM

## 2024-04-30 DIAGNOSIS — K21.9 GASTROESOPHAGEAL REFLUX DISEASE WITHOUT ESOPHAGITIS: ICD-10-CM

## 2024-04-30 DIAGNOSIS — D84.821 DRUG-INDUCED IMMUNODEFICIENCY: ICD-10-CM

## 2024-04-30 DIAGNOSIS — L40.9 PSORIASIS: ICD-10-CM

## 2024-04-30 DIAGNOSIS — Z79.899 DRUG-INDUCED IMMUNODEFICIENCY: ICD-10-CM

## 2024-04-30 DIAGNOSIS — M54.42 CHRONIC MIDLINE LOW BACK PAIN WITH LEFT-SIDED SCIATICA: ICD-10-CM

## 2024-04-30 DIAGNOSIS — G43.109 MIGRAINE WITH AURA AND WITHOUT STATUS MIGRAINOSUS, NOT INTRACTABLE: ICD-10-CM

## 2024-04-30 DIAGNOSIS — G89.29 CHRONIC MIDLINE LOW BACK PAIN WITH LEFT-SIDED SCIATICA: ICD-10-CM

## 2024-04-30 DIAGNOSIS — E89.0 POSTOPERATIVE HYPOTHYROIDISM: ICD-10-CM

## 2024-04-30 DIAGNOSIS — Z79.899 HIGH RISK MEDICATION USE: ICD-10-CM

## 2024-04-30 PROCEDURE — 1160F RVW MEDS BY RX/DR IN RCRD: CPT | Mod: CPTII,,, | Performed by: NURSE PRACTITIONER

## 2024-04-30 PROCEDURE — 1159F MED LIST DOCD IN RCRD: CPT | Mod: CPTII,,, | Performed by: NURSE PRACTITIONER

## 2024-04-30 PROCEDURE — 3079F DIAST BP 80-89 MM HG: CPT | Mod: CPTII,,, | Performed by: NURSE PRACTITIONER

## 2024-04-30 PROCEDURE — 3008F BODY MASS INDEX DOCD: CPT | Mod: CPTII,,, | Performed by: NURSE PRACTITIONER

## 2024-04-30 PROCEDURE — 3074F SYST BP LT 130 MM HG: CPT | Mod: CPTII,,, | Performed by: NURSE PRACTITIONER

## 2024-04-30 PROCEDURE — 99213 OFFICE O/P EST LOW 20 MIN: CPT | Mod: PBBFAC | Performed by: NURSE PRACTITIONER

## 2024-04-30 PROCEDURE — 99215 OFFICE O/P EST HI 40 MIN: CPT | Mod: S$PBB,,, | Performed by: NURSE PRACTITIONER

## 2024-04-30 RX ORDER — FOLIC ACID 1 MG/1
1 TABLET ORAL DAILY
Qty: 30 TABLET | Refills: 5 | Status: SHIPPED | OUTPATIENT
Start: 2024-04-30 | End: 2024-10-27

## 2024-04-30 RX ORDER — MEDROXYPROGESTERONE ACETATE 150 MG/ML
150 INJECTION, SUSPENSION INTRAMUSCULAR
COMMUNITY

## 2024-04-30 RX ORDER — METHOTREXATE 2.5 MG/1
TABLET ORAL
Qty: 30 TABLET | Refills: 0 | Status: SHIPPED | OUTPATIENT
Start: 2024-04-30 | End: 2024-05-30 | Stop reason: SDUPTHER

## 2024-05-30 ENCOUNTER — LAB VISIT (OUTPATIENT)
Dept: LAB | Facility: HOSPITAL | Age: 44
End: 2024-05-30
Attending: NURSE PRACTITIONER
Payer: MEDICAID

## 2024-05-30 DIAGNOSIS — G89.29 CHRONIC MIDLINE LOW BACK PAIN WITH LEFT-SIDED SCIATICA: Primary | ICD-10-CM

## 2024-05-30 DIAGNOSIS — I10 HYPERTENSION, UNSPECIFIED TYPE: ICD-10-CM

## 2024-05-30 DIAGNOSIS — L40.50 PSORIATIC ARTHRITIS: ICD-10-CM

## 2024-05-30 DIAGNOSIS — M54.42 CHRONIC MIDLINE LOW BACK PAIN WITH LEFT-SIDED SCIATICA: Primary | ICD-10-CM

## 2024-05-30 DIAGNOSIS — Z79.899 DRUG-INDUCED IMMUNODEFICIENCY: ICD-10-CM

## 2024-05-30 DIAGNOSIS — L40.9 PSORIASIS: ICD-10-CM

## 2024-05-30 DIAGNOSIS — D84.821 DRUG-INDUCED IMMUNODEFICIENCY: ICD-10-CM

## 2024-05-30 DIAGNOSIS — E03.9 HYPOTHYROIDISM, UNSPECIFIED TYPE: ICD-10-CM

## 2024-05-30 DIAGNOSIS — Z79.899 HIGH RISK MEDICATION USE: ICD-10-CM

## 2024-05-30 LAB
ALBUMIN SERPL-MCNC: 4 G/DL (ref 3.5–5)
ALBUMIN/GLOB SERPL: 1.4 RATIO (ref 1.1–2)
ALP SERPL-CCNC: 68 UNIT/L (ref 40–150)
ALT SERPL-CCNC: 30 UNIT/L (ref 0–55)
ANION GAP SERPL CALC-SCNC: 6 MEQ/L
AST SERPL-CCNC: 16 UNIT/L (ref 5–34)
BASOPHILS # BLD AUTO: 0.02 X10(3)/MCL
BASOPHILS NFR BLD AUTO: 0.3 %
BILIRUB SERPL-MCNC: 0.4 MG/DL
BUN SERPL-MCNC: 17.4 MG/DL (ref 7–18.7)
CALCIUM SERPL-MCNC: 9.1 MG/DL (ref 8.4–10.2)
CHLORIDE SERPL-SCNC: 109 MMOL/L (ref 98–107)
CO2 SERPL-SCNC: 24 MMOL/L (ref 22–29)
CREAT SERPL-MCNC: 1.01 MG/DL (ref 0.55–1.02)
CREAT/UREA NIT SERPL: 17
EOSINOPHIL # BLD AUTO: 0.08 X10(3)/MCL (ref 0–0.9)
EOSINOPHIL NFR BLD AUTO: 1.3 %
ERYTHROCYTE [DISTWIDTH] IN BLOOD BY AUTOMATED COUNT: 14.2 % (ref 11.5–17)
GFR SERPLBLD CREATININE-BSD FMLA CKD-EPI: >60 ML/MIN/1.73/M2
GLOBULIN SER-MCNC: 2.9 GM/DL (ref 2.4–3.5)
GLUCOSE SERPL-MCNC: 99 MG/DL (ref 74–100)
HCT VFR BLD AUTO: 34.6 % (ref 37–47)
HGB BLD-MCNC: 11.1 G/DL (ref 12–16)
IMM GRANULOCYTES # BLD AUTO: 0.02 X10(3)/MCL (ref 0–0.04)
IMM GRANULOCYTES NFR BLD AUTO: 0.3 %
LYMPHOCYTES # BLD AUTO: 1.51 X10(3)/MCL (ref 0.6–4.6)
LYMPHOCYTES NFR BLD AUTO: 24.4 %
MCH RBC QN AUTO: 29.8 PG (ref 27–31)
MCHC RBC AUTO-ENTMCNC: 32.1 G/DL (ref 33–36)
MCV RBC AUTO: 92.8 FL (ref 80–94)
MONOCYTES # BLD AUTO: 0.45 X10(3)/MCL (ref 0.1–1.3)
MONOCYTES NFR BLD AUTO: 7.3 %
NEUTROPHILS # BLD AUTO: 4.12 X10(3)/MCL (ref 2.1–9.2)
NEUTROPHILS NFR BLD AUTO: 66.4 %
NRBC BLD AUTO-RTO: 0 %
PLATELET # BLD AUTO: 215 X10(3)/MCL (ref 130–400)
PMV BLD AUTO: 10.7 FL (ref 7.4–10.4)
POTASSIUM SERPL-SCNC: 3.9 MMOL/L (ref 3.5–5.1)
PROT SERPL-MCNC: 6.9 GM/DL (ref 6.4–8.3)
RBC # BLD AUTO: 3.73 X10(6)/MCL (ref 4.2–5.4)
SODIUM SERPL-SCNC: 139 MMOL/L (ref 136–145)
WBC # SPEC AUTO: 6.2 X10(3)/MCL (ref 4.5–11.5)

## 2024-05-30 PROCEDURE — 80053 COMPREHEN METABOLIC PANEL: CPT

## 2024-05-30 PROCEDURE — 36415 COLL VENOUS BLD VENIPUNCTURE: CPT

## 2024-05-30 PROCEDURE — 85025 COMPLETE CBC W/AUTO DIFF WBC: CPT

## 2024-05-30 RX ORDER — METHOTREXATE 2.5 MG/1
TABLET ORAL
Qty: 30 TABLET | Refills: 2 | Status: SHIPPED | OUTPATIENT
Start: 2024-05-30

## 2024-05-31 ENCOUNTER — TELEPHONE (OUTPATIENT)
Dept: RHEUMATOLOGY | Facility: CLINIC | Age: 44
End: 2024-05-31
Payer: MEDICAID

## 2024-05-31 NOTE — TELEPHONE ENCOUNTER
----- Message from CHAUNCEY Oliveros sent at 5/30/2024  3:04 PM CDT -----  Please advise the patient that all lab are noted to be clinically acceptable- she did have a drop in her h/h from previous lab, possibly on her cycle? We will continue to monitor at future lab draws- her MTX was refilled and sent to her pharm

## 2024-05-31 NOTE — TELEPHONE ENCOUNTER
Spoke to pt informed pt of message. Pt verbalized understanding.Pt stated not feeling well instructed pt to hold MTX until feeling better. Pt verbalized understanding

## 2024-07-16 NOTE — PROGRESS NOTES
"  Patient ID: [id number]     Chief Complaint: No chief complaint on file.    HPI:     Ena Mallory is a 43 y.o. female here today for a follow up of PsA.      Presents today for evaluation of PsA, referred by her PCP. Initially diagnosed with Psoriasis in 2002, after sinus surgery, she reports she was having a rash after her surgery and diagnosed after a bx at this time. She has not seen Derm in several years but most recently has been followed by Dr. Allen- last seen roughly 1 year ago. She reports he was managing her skin with topical agents but has never taken any oral or injection medications. She was given Gabapentin, Ultram and Mobic. She has been on Savella in the past as well for Fibro but could not afford to continue to take. She reports her last skin flare was roughly a few weeks ago, lower back and upper buttocks, states this is the first time her skin flares here- previously has been her feet/toes/knees and elbows- She was using Betamethasone topically and did help. She reports a lot of joint pain, mostly her hands/fingers, her knees (L>R)- has had previously arthroscopy on this knee but has continued pain and her left hip is also painful. She reports most recently she has been having more lower back pain, having a lot of pain "locking up" on her. She denies any back injury in the past. She cannot sleep on her back, has to sleep on her right side, occ wakes her up at times. She reports back pain worse with activity. 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 She has taken Ultram up to 4 times a day and is helpful, she states she only takes it once a week, only if she really needs it- not taking it qid.   She reports her knee will get red/warm/swollen at times, she also feels her hip will swell, hands and fingers will also swell. States left side is worse than right side. She does report dactylitis in her fingers and toes at times, last occurred a " few months ago. Painful to wear shoes when occurs. She does have pain and swelling behind her ankles. Morning stiffness lasting for roughly 1-2 hours. She reports she was getting steroid injections in her left knee, did not help much, relief was very short lived and has not completed in several years. She was previously followed by Ortho in 2009 here at [facility] and has not seen since.   She reports skin sensitivity when she goes out in the sun- will get sun burned easily but not worsening of rashes, also reports eyes are very sensitive when she goes in the sun. She does report oral ulcers in the past, last occurred a few weeks ago, very painful and has a hard time eating, denies any vaginal or nasal ulcerations, does not occur often. She also has a history of GI issues, has a lot of reflux and nausea, possibly related to NSAID use, takes Prilosec and a Probiotic daily and this helps- is not followed by GI. Denies bloody stools but does have a lot of abd bloating and gas.      April 2024: Here today for follow up. Received records from Dr. Allen- he was managing his Fibromyalgia, previous discussion in regards to oral meds for Psoriasis but never started.   Her toes will bother her off an on during the week- does report she had an episode of dactylitis since her last visit. She continues to have back pain, chronic, depends on her activity- just started PT. Morning stiffness lasting roughly 2 hours. She also suffers with headaches- she was previously followed by Neurology- last seen roughly 9 years ago, at this time they did discuss Biotox but she states she lost her insurance and was not able to follow back up- headaches several times a week (at least 2-3)- Excedrin Migraine does help- occ will have to take Ibuprofen after.  Does not have regular cycles anymore so not associated. Mother with history of Migraines.     July 2024: Here today for follow up. She has been taking MTX since April 2024- reports took all of May  but did have a really bad sinus inufection in  and had to hold meds x 3 weks- she has resumed since and has tolerated well with no s/e. She denies noticing any red/warm/swollen joints, morning stiffness still lasting roughly 2 hours. She continues to have back pain- she is doing PT, has helped some- had to move to once a week due to family obligations with her son. Using Icy hot patches and does help. She denies any dactylitis at this time. She did have a rash on her hands/fingers/toes, states felt exactly like her feet- very itchy- lasted a few weeks- she was trying to keep her hands as moisturized as possible but with washing her hands frequently with work cotninued to dry out. She states she did have a slight rash to her lower back with rash but used topical and resolved quickly. She reports she is very tired, not sleeping well as her son is having issues and has not been sleeping therefore she has not been sleeping so feels a combination of symptoms contributing at this time. Migraines have continued- she does have an apt with Neurology in 2024. Denies any oral ulcers in a while.     Dr. Allen- Rheum in the past, last seen 1 year ago  [facility]   Dr. Arnold- Cardiology- [facility] left Thyroid- s/p Thyroidectomy () benign     Denies history of fevers, photosensitivity, nasal ulcers, h/o MI, stroke, seizures, h/o PE or DVT, Raynaud's phenomenon, uveitis, malignancies.   Family history of autoimmune disease: None.   Pregnancies: 2 Miscarriages: None.   Smoking: Quit , hx of social smoker, started around late 's- did not smoke daily     Social History     Tobacco Use   Smoking Status Former    Types: Cigarettes   Smokeless Tobacco Former        -------------------------------------    Allergy        Past Surgical History:   Procedure Laterality Date     SECTION      KNEE  ARTHROSCOPY W/ ARTHROTOMY Left 2009    SINUS SURGERY  2002    THYROIDECTOMY Left 2012    Umbical Hernia Repair N/A 12/2017       Review of patient's allergies indicates:   Allergen Reactions    Sulfa (sulfonamide antibiotics) Hives    Sulfasalazine Other (See Comments)    Adhesive tape-silicones Dermatitis and Rash       Current Outpatient Medications   Medication Instructions    albuterol (PROVENTIL/VENTOLIN HFA) 90 mcg/actuation inhaler 2 puffs, Every 4-6 hours PRN    buPROPion (WELLBUTRIN SR) 150 mg, Oral, 2 times daily    famotidine (PEPCID) 40 mg, Oral, Daily    folic acid (FOLVITE) 1 mg, Oral, Daily, After food    gabapentin (NEURONTIN) 600 mg, Oral, 3 times daily    medroxyPROGESTERone (DEPO-PROVERA) 150 mg, Intramuscular, Every 3 months    meloxicam (MOBIC) 15 mg, Oral, Daily    methotrexate 2.5 MG Tab Take 6 tablets po once a week. (Ok to split the dose and take 3 tab in the am and 3 tab in the pm). Hold for infection or fever.    metoclopramide HCl (REGLAN) 5 mg, Before meals & nightly    metoprolol succinate (TOPROL-XL) 25 mg, Oral    metoprolol tartrate (LOPRESSOR) 25 mg, Daily    omeprazole (PRILOSEC) 40 mg, Oral, Daily    simvastatin (ZOCOR) 40 MG tablet 1 tablet, Oral, Nightly    traMADoL (ULTRAM) 50 mg, Oral, Every 6 hours       Social History     Socioeconomic History    Marital status:    Tobacco Use    Smoking status: Former     Types: Cigarettes    Smokeless tobacco: Former   Substance and Sexual Activity    Alcohol use: Not Currently     Comment: occ    Drug use: Not Currently    Sexual activity: Not Currently     Birth control/protection: Injection        Family History   Problem Relation Name Age of Onset    Chronic back pain Mother      Asthma Mother      Hypertension Father      Heart disease Father      Diabetes Mellitus Father      Diabetes Mellitus Brother      Hypertension Maternal Grandmother      Depression Maternal Grandmother      Cancer Maternal Grandmother      COPD  Maternal Grandfather      Cancer Paternal Grandmother          Immunization History   Administered Date(s) Administered    COVID-19 Vaccine 03/12/2022    COVID-19, MRNA, LN-S, PF (MODERNA FULL 0.5 ML DOSE) 03/12/2022    Influenza - Quadrivalent - PF *Preferred* (6 months and older) 11/27/2013    Influenza - Trivalent - PF (ADULT) 11/27/2013    Pneumococcal Conjugate - 7 Valent 11/27/2013    Pneumococcal Polysaccharide - 23 Valent 11/27/2013    Tdap 11/27/2013       Patient Care Team:  Davi Reyes MD as PCP - General (Family Medicine)     Subjective:     ROS    Constitutional:  Denies chills. Denies fever. Admits night sweats. Denies weight loss. Denies sleep disturbance.   Ophthalmology: Denies blurred vision. Denies diminished visual acuity. Admits dry eyes- uses gtts and helps. Denies eye pain. Admits Burning and redness- not at this time- occurred in the past. Denies itching   ENT: Denies decreased hearing. Denies oral ulcers. Denies epistaxis. Denies swelling of ears or throat. Denies dry mouth. Denies swollen glands.   Endocrine: Denies diabetes. Admits thyroid Problems.   Respiratory: Denies cough. Denies shortness of breath. Denies shortness of breath with exertion. Denies hemoptysis.   Cardiovascular: Denies chest pain at rest. Denies chest pain with exertion. Denies Irregular heartbeat. Admits palpitations- hx of PACs- follows with Dr. Arnold. Denies edema. Denies orthopnea.   Gastrointestinal: Denies abdominal pain. Denies diarrhea. Denies nausea. Denies vomiting. Denies hematemesis or hematochezia. Denies change in appetite. Admits heartburn and bloating.   Genitourinary: Denies dysuria. Denies urinary frequency. Denies urinary urgency. Denies blood in urine.  Musculoskeletal: See HPI for details  Integumentary: Admits rash. Denies Itching. Denies dry skin. Denies photosensitivity.   Peripheral Vascular: Denies Ulcers of hands and/or feet. Denies Cold extremities.   Neurologic: Denies dizziness.  "Admits headache. Denies difficulty speaking. Denies loss of strength Denies numbness or tingling.   Psychiatric: Denies depression and anxiety- currently controlled with meds. Denies suicidal/homicidal ideations.      Objective:     Visit Vitals  /81 (BP Location: Left arm, Patient Position: Sitting, BP Method: Medium (Automatic))   Pulse 66   Temp 97.9 °F (36.6 °C) (Oral)   Resp 18   Ht 4' 11" (1.499 m)   Wt 62.8 kg (138 lb 6.4 oz)   SpO2 100%   BMI 27.95 kg/m²       Physical Exam    General Appearance: alert, pleasant, in no acute distress.  Skin: Skin color, texture, turgor normal. No rashes or lesions. Spider veins noted to bilateral lower extremities.   Eyes:  extraocular movement intact (EOMI), pupils equal, round, reactive to light and accommodation, conjunctiva clear.  ENT: No oral or nasal ulcers.  Neck:  Neck supple. No adenopathy.   Lungs: CTA bilaterally without crackles, rhonchi, or wheezes.   Heart: RRR w/o murmurs.  No edema. 2+ DP pulse.  Abdomen: Soft, non-tender, no masses, rebound or guarding.  Neuro: Alert, oriented, CN II-XII GI, sensory and motor innervation intact.  Musculoskeletal: No synovitis noted to bilateral MCPs/PIPs today, FROM with no pain to bilateral wrists, elbows and shoulders, FROM noted to knees with crepitus noted bilaterally (L>R), mild tenderness to bilateral ankles and MTPs, unable to assess nail beds as her nails and toe nails are painted, no dactylitis or enthesitis noted   Psych: Alert, oriented, normal eye contact.       Labs Reviewed:   1/5/2023 Vitamin D 20.1.CMP ok, CBC ok.     3/25/2024 Hep B Surface Ab Reactive, CMP ok. CBC ok. Hep B surface Ag, Core Ab/C/HIV all negative, CRP 4.50 (<5), Sed Rate 2 (<20). HLA B27 negative. SANTA negative. CCP negative. RF negative, Quantiferon negative     5/30/2024 CMP ok, CBC hgb 11, hct 34.6 (drop from previous), otherwise ok.   Rheumatology:  Lab Results   Component Value Date    ANAHS <1:80 (Negative) 03/25/2024    [id number] " "Negative 03/25/2024    INTER SEE COMMENTS 03/25/2024    SEDRATE 2 03/25/2024        Chemistry:  Lab Results   Component Value Date     05/30/2024    K 3.9 05/30/2024    BUN 17.4 05/30/2024    CREATININE 1.01 05/30/2024    EGFRNORACEVR >60 05/30/2024    GLUCOSE 99 05/30/2024    CALCIUM 9.1 05/30/2024    ALKPHOS 68 05/30/2024    LABPROT 6.9 05/30/2024    ALBUMIN 4.0 05/30/2024    AST 16 05/30/2024    ALT 30 05/30/2024        Hematology:  Lab Results   Component Value Date    WBC 6.20 05/30/2024    HGB 11.1 (L) 05/30/2024    HCT 34.6 (L) 05/30/2024     05/30/2024        Urine:  No results found for: "COLORUA", "APPEARANCEUA", "SGUA", "PHUA", "PROTEINUA", "GLUCOSEUA", "KETONESUA", "BLOODUA", "NITRITESUA", "LEUKOCYTESUR", "RBCUA", "WBCUA", "BACTERIA", "SQEPUA", "HYALINECASTS", "CREATRANDUR", "PROTEINURINE", "UPROTCREA"     No results found for: "PROTEINURINE", "CREATRANDUR", "UPROTCREA"     Imaging:   3/25/2024 CXRay: Impression: No radiographic evidence of an acute cardiopulmonary process.  Left and Right Foot Xray: Impression: No acute abnormalities are seen  Left and Right hand Xray: Impression: No acute abnormalities are seen  Left and Right Knee Xray: Impression:  No acute abnormalities are seen  Left and Right Hip Xray: Impression: No acute osseous abnormality.  Lspine: FINDINGS: Degenerative changes are identified with narrowing and some sclerosis and marginal osteophytosis at L2-L3 some degenerative changes seen of the posterior elements at L4-L5 and L5-S1. Impression: Degenerative changes. No clear evidence of instability  SI Joint Xray: Impression:  No acute osseous abnormality.      Assessment:       ICD-10-CM ICD-9-CM   1. Psoriatic arthritis  L40.50 696.0   2. Psoriasis  L40.9 696.1   3. Drug-induced immunodeficiency  D84.821 279.3    Z79.899 E947.9   4. High risk medication use  Z79.899 V58.69   5. Chronic midline low back pain with left-sided sciatica  M54.42 724.2    G89.29 724.3     338.29 "   6. Fibromyalgia  M79.7 729.1   7. Gastroesophageal reflux disease without esophagitis  K21.9 530.81   8. Primary hypertension  I10 401.9   9. Hypothyroidism, unspecified type  E03.9 244.9   10. Migraine with aura and without status migrainosus, not intractable  G43.109 346.00          Plan:     1. Psoriasis/Psoriatic arthritis  Diagnosed with Psoriasis in 2002 after skin biopsy- was developing rashes after sinus surgery and bx confirmed Psoriasis- she has not seen Derm in several years and was referred to Rheumatology Dr. Allen- has not seen in the past 1 year. She denies taking any meds for Psoriasis besides topicals, no orals or injections. She reports she was being managed with Ultram and Mobic. She gives a history of pain in her hands, knees, hips and toes with morning stiffness lasting 2 hours, also gives a history of dactylitis and enthesitis in the past. Last rash occurred a few weeks ago, first time occurred in lower back/upper buttocks area, previously on knees, feet and elbows- rash of feet noted on phone and Psoriatic in nature.  Today on exam, mild synovitis noted to MTPs bilaterally, no dactylitis, enthesitis. No active rashes noted today.   - Increase MTX to 20 mg po qweek with folic acid 1 mg po qd  - On Depo with her GYN- due repeat injection this week   - Infection w/u negative 4/2024  - Cxray ok 4/2024  - Ok to continue Mobic as directed however should monitor intake with history of reflux- will consider referral to GI in the future- defers at this time     2. High Risk Med use/Drug Induced Immunodeficency  - Persons with rheumatoid arthritis, lupus, psoriatic arthritis and other autoimmune diseases are at increased risk of cardiovascular disease including heart attack and stroke. We recommend that all patients with these conditions have annual health maintenance exams including lipid measurements, blood pressure measurements, and smoking cessation counseling when applicable at their primary  care provider's office.  - Pap/Mammogram UTD with GYN  - Advised to stay up-to-date on age appropriate vaccinations and malignancy screening with PCP.   - Continued lab monitoring.      3.  Chronic midline low back pain with left-sided sciatica  - April 2024 Lspine: FINDINGS: Degenerative changes are identified with narrowing and some sclerosis and marginal osteophytosis at L2-L3 some degenerative changes seen of the posterior elements at L4-L5 and L5-S1. Impression: Degenerative changes. No clear evidence of instability  - Enc stretches, started PT and tolerating well, has found helpful- continues to go weekly and tolerating well    4. Fibromyalgia  - No issues noted at this time   - She was advised that we do no manage Fibro here, that we will continue to defer treatment to PCP     5. Hypothyroidism/HTN  - Followed and managed by PCP, currently stable today, enc low Na+ diet  - Diet modifications and enc to cut back on use of NSAIDs- she defers GI referral at this time, will consider in the future.     7. Migraines  - Migraines for several years- was previously followed by Neurology (cannot recall name)- discussed possible Botox but lost her insurance and was not able to follow up- has been managing with Excedrin Migraine and Motrin/Aleve- reports light sensitivity, has to lay down in dark room with no lights, nausea, occ vomiting and dizziness associated with headaches- occurs roughly 2-3 times a week- OTC meds do help some days- would like referral back to Neurology- possibly to help decrease use of NSAIDs with treatment  - Referral sent to Neurology- has apt 12/2024 with Dr. Medellin    No follow-ups on file. In addition to their scheduled follow up, the patient has also been instructed to follow up on as needed basis.     Total time spent with patient and documentation is 35 minutes. All questions were answered to patient's satisfaction and patient verbalized understanding. This includes face to face time and  non-face to face time preparing to see the patient (eg, review of tests), obtaining and/or reviewing separately obtained history, documenting clinical information in the electronic or other health record, independently interpreting results and communicating results to the patient/family/caregiver, or care coordinator.      CHAUNCEY Oliveros    No orders of the defined types were placed in this encounter.

## 2024-07-17 ENCOUNTER — LAB VISIT (OUTPATIENT)
Dept: LAB | Facility: HOSPITAL | Age: 44
End: 2024-07-17
Attending: NURSE PRACTITIONER
Payer: MEDICAID

## 2024-07-17 ENCOUNTER — OFFICE VISIT (OUTPATIENT)
Dept: RHEUMATOLOGY | Facility: CLINIC | Age: 44
End: 2024-07-17
Payer: MEDICAID

## 2024-07-17 VITALS
OXYGEN SATURATION: 100 % | DIASTOLIC BLOOD PRESSURE: 81 MMHG | SYSTOLIC BLOOD PRESSURE: 120 MMHG | RESPIRATION RATE: 18 BRPM | WEIGHT: 138.38 LBS | HEIGHT: 59 IN | BODY MASS INDEX: 27.9 KG/M2 | HEART RATE: 66 BPM | TEMPERATURE: 98 F

## 2024-07-17 DIAGNOSIS — G43.109 MIGRAINE WITH AURA AND WITHOUT STATUS MIGRAINOSUS, NOT INTRACTABLE: ICD-10-CM

## 2024-07-17 DIAGNOSIS — D84.821 DRUG-INDUCED IMMUNODEFICIENCY: ICD-10-CM

## 2024-07-17 DIAGNOSIS — I10 PRIMARY HYPERTENSION: ICD-10-CM

## 2024-07-17 DIAGNOSIS — L40.9 PSORIASIS: ICD-10-CM

## 2024-07-17 DIAGNOSIS — L40.50 PSORIATIC ARTHRITIS: ICD-10-CM

## 2024-07-17 DIAGNOSIS — Z79.899 DRUG-INDUCED IMMUNODEFICIENCY: ICD-10-CM

## 2024-07-17 DIAGNOSIS — G89.29 CHRONIC MIDLINE LOW BACK PAIN WITH LEFT-SIDED SCIATICA: ICD-10-CM

## 2024-07-17 DIAGNOSIS — M79.7 FIBROMYALGIA: ICD-10-CM

## 2024-07-17 DIAGNOSIS — L40.50 PSORIATIC ARTHRITIS: Primary | ICD-10-CM

## 2024-07-17 DIAGNOSIS — E03.9 HYPOTHYROIDISM, UNSPECIFIED TYPE: ICD-10-CM

## 2024-07-17 DIAGNOSIS — M54.42 CHRONIC MIDLINE LOW BACK PAIN WITH LEFT-SIDED SCIATICA: ICD-10-CM

## 2024-07-17 DIAGNOSIS — Z79.899 HIGH RISK MEDICATION USE: ICD-10-CM

## 2024-07-17 DIAGNOSIS — K21.9 GASTROESOPHAGEAL REFLUX DISEASE WITHOUT ESOPHAGITIS: ICD-10-CM

## 2024-07-17 LAB
ALBUMIN SERPL-MCNC: 4.4 G/DL (ref 3.5–5)
ALBUMIN/GLOB SERPL: 1.5 RATIO (ref 1.1–2)
ALP SERPL-CCNC: 66 UNIT/L (ref 40–150)
ALT SERPL-CCNC: 15 UNIT/L (ref 0–55)
ANION GAP SERPL CALC-SCNC: 10 MEQ/L
AST SERPL-CCNC: 14 UNIT/L (ref 5–34)
BASOPHILS # BLD AUTO: 0.02 X10(3)/MCL
BASOPHILS NFR BLD AUTO: 0.3 %
BILIRUB SERPL-MCNC: 0.5 MG/DL
BUN SERPL-MCNC: 21.2 MG/DL (ref 7–18.7)
CALCIUM SERPL-MCNC: 9.8 MG/DL (ref 8.4–10.2)
CHLORIDE SERPL-SCNC: 106 MMOL/L (ref 98–107)
CO2 SERPL-SCNC: 25 MMOL/L (ref 22–29)
CREAT SERPL-MCNC: 1.06 MG/DL (ref 0.55–1.02)
CREAT/UREA NIT SERPL: 20
CRP SERPL-MCNC: 1.3 MG/L
EOSINOPHIL # BLD AUTO: 0.09 X10(3)/MCL (ref 0–0.9)
EOSINOPHIL NFR BLD AUTO: 1.4 %
ERYTHROCYTE [DISTWIDTH] IN BLOOD BY AUTOMATED COUNT: 14.5 % (ref 11.5–17)
ERYTHROCYTE [SEDIMENTATION RATE] IN BLOOD: 1 MM/HR (ref 0–20)
GFR SERPLBLD CREATININE-BSD FMLA CKD-EPI: >60 ML/MIN/1.73/M2
GLOBULIN SER-MCNC: 3 GM/DL (ref 2.4–3.5)
GLUCOSE SERPL-MCNC: 107 MG/DL (ref 74–100)
HCT VFR BLD AUTO: 38 % (ref 37–47)
HGB BLD-MCNC: 12.3 G/DL (ref 12–16)
IMM GRANULOCYTES # BLD AUTO: 0.01 X10(3)/MCL (ref 0–0.04)
IMM GRANULOCYTES NFR BLD AUTO: 0.2 %
LYMPHOCYTES # BLD AUTO: 1.68 X10(3)/MCL (ref 0.6–4.6)
LYMPHOCYTES NFR BLD AUTO: 26.7 %
MCH RBC QN AUTO: 30 PG (ref 27–31)
MCHC RBC AUTO-ENTMCNC: 32.4 G/DL (ref 33–36)
MCV RBC AUTO: 92.7 FL (ref 80–94)
MONOCYTES # BLD AUTO: 0.46 X10(3)/MCL (ref 0.1–1.3)
MONOCYTES NFR BLD AUTO: 7.3 %
NEUTROPHILS # BLD AUTO: 4.03 X10(3)/MCL (ref 2.1–9.2)
NEUTROPHILS NFR BLD AUTO: 64.1 %
NRBC BLD AUTO-RTO: 0 %
PLATELET # BLD AUTO: 275 X10(3)/MCL (ref 130–400)
PMV BLD AUTO: 10.3 FL (ref 7.4–10.4)
POTASSIUM SERPL-SCNC: 3.7 MMOL/L (ref 3.5–5.1)
PROT SERPL-MCNC: 7.4 GM/DL (ref 6.4–8.3)
RBC # BLD AUTO: 4.1 X10(6)/MCL (ref 4.2–5.4)
SODIUM SERPL-SCNC: 141 MMOL/L (ref 136–145)
WBC # BLD AUTO: 6.29 X10(3)/MCL (ref 4.5–11.5)

## 2024-07-17 PROCEDURE — 80053 COMPREHEN METABOLIC PANEL: CPT

## 2024-07-17 PROCEDURE — 36415 COLL VENOUS BLD VENIPUNCTURE: CPT

## 2024-07-17 PROCEDURE — 85652 RBC SED RATE AUTOMATED: CPT

## 2024-07-17 PROCEDURE — 86140 C-REACTIVE PROTEIN: CPT

## 2024-07-17 PROCEDURE — 3079F DIAST BP 80-89 MM HG: CPT | Mod: CPTII,,, | Performed by: NURSE PRACTITIONER

## 2024-07-17 PROCEDURE — 99214 OFFICE O/P EST MOD 30 MIN: CPT | Mod: S$PBB,,, | Performed by: NURSE PRACTITIONER

## 2024-07-17 PROCEDURE — 3008F BODY MASS INDEX DOCD: CPT | Mod: CPTII,,, | Performed by: NURSE PRACTITIONER

## 2024-07-17 PROCEDURE — 99213 OFFICE O/P EST LOW 20 MIN: CPT | Mod: PBBFAC | Performed by: NURSE PRACTITIONER

## 2024-07-17 PROCEDURE — 3074F SYST BP LT 130 MM HG: CPT | Mod: CPTII,,, | Performed by: NURSE PRACTITIONER

## 2024-07-17 PROCEDURE — 85025 COMPLETE CBC W/AUTO DIFF WBC: CPT

## 2024-07-17 PROCEDURE — 1160F RVW MEDS BY RX/DR IN RCRD: CPT | Mod: CPTII,,, | Performed by: NURSE PRACTITIONER

## 2024-07-17 PROCEDURE — 1159F MED LIST DOCD IN RCRD: CPT | Mod: CPTII,,, | Performed by: NURSE PRACTITIONER

## 2024-07-17 RX ORDER — METHOTREXATE 2.5 MG/1
TABLET ORAL
Qty: 40 TABLET | Refills: 2 | Status: SHIPPED | OUTPATIENT
Start: 2024-07-17

## 2024-07-17 RX ORDER — FOLIC ACID 1 MG/1
1 TABLET ORAL DAILY
Qty: 30 TABLET | Refills: 5 | Status: SHIPPED | OUTPATIENT
Start: 2024-07-17 | End: 2025-01-13

## 2024-07-17 RX ORDER — ALBUTEROL SULFATE 90 UG/1
2 AEROSOL, METERED RESPIRATORY (INHALATION)
COMMUNITY
Start: 2024-05-31

## 2024-07-17 RX ORDER — CLOBETASOL PROPIONATE 0.5 MG/G
CREAM TOPICAL 2 TIMES DAILY
Qty: 60 G | Refills: 2 | Status: SHIPPED | OUTPATIENT
Start: 2024-07-17

## 2024-07-17 RX ORDER — METOPROLOL SUCCINATE 25 MG/1
25 TABLET, EXTENDED RELEASE ORAL
COMMUNITY
Start: 2024-07-05

## 2024-07-17 RX ORDER — SIMVASTATIN 40 MG/1
1 TABLET, FILM COATED ORAL NIGHTLY
COMMUNITY
Start: 2024-04-05

## 2024-07-17 RX ORDER — PREDNISONE 5 MG/1
TABLET ORAL
Qty: 30 TABLET | Refills: 0 | Status: SHIPPED | OUTPATIENT
Start: 2024-07-17

## 2024-07-19 ENCOUNTER — TELEPHONE (OUTPATIENT)
Dept: RHEUMATOLOGY | Facility: CLINIC | Age: 44
End: 2024-07-19
Payer: MEDICAID

## 2024-07-19 DIAGNOSIS — L40.9 PSORIASIS: ICD-10-CM

## 2024-07-19 DIAGNOSIS — L40.50 PSORIATIC ARTHRITIS: Primary | ICD-10-CM

## 2024-07-19 NOTE — TELEPHONE ENCOUNTER
Spoke with pt informed of message. Pt verbalized  understanding Pt stated will talk with /Courtney regarding GI referral on next visit . Pt stated agreed with sending Dermatology and Ophthalmology referrals

## 2024-07-19 NOTE — TELEPHONE ENCOUNTER
Referrals to Derm and Ophthalmology sent over, we will discuss GI referral at follow up -thanks for the update

## 2024-07-19 NOTE — TELEPHONE ENCOUNTER
----- Message from CHAUNCEY Oliveros sent at 7/17/2024  5:37 PM CDT -----  Please advise the patient that all lab are noted to be clinically acceptable, we will continue to monitor at future lab draws.  We will need to repeat lab in 4 weeks since we increase her dose of methotrexate, orders placed in the computer, if we can also place a reminder to call in 4 weeks.  We did discuss previously referral to dermatology/GI and Ophthalmology, please let me know if she wishes to pursue these referrals being placed to [facility] care here, please let her know that the appointments are several months out at this time so I would recommend sending so she can get an appointment scheduled in the next few months- please keep me posted, if she agrees I will proceed with placing referrals

## 2024-08-16 ENCOUNTER — LAB VISIT (OUTPATIENT)
Dept: LAB | Facility: HOSPITAL | Age: 44
End: 2024-08-16
Attending: NURSE PRACTITIONER
Payer: MEDICAID

## 2024-08-16 DIAGNOSIS — D84.821 DRUG-INDUCED IMMUNODEFICIENCY: ICD-10-CM

## 2024-08-16 DIAGNOSIS — Z79.899 DRUG-INDUCED IMMUNODEFICIENCY: ICD-10-CM

## 2024-08-16 DIAGNOSIS — L40.50 PSORIATIC ARTHRITIS: ICD-10-CM

## 2024-08-16 DIAGNOSIS — Z79.899 HIGH RISK MEDICATION USE: ICD-10-CM

## 2024-08-16 LAB
ALBUMIN SERPL-MCNC: 3.9 G/DL (ref 3.5–5)
ALBUMIN/GLOB SERPL: 1.3 RATIO (ref 1.1–2)
ALP SERPL-CCNC: 58 UNIT/L (ref 40–150)
ALT SERPL-CCNC: 12 UNIT/L (ref 0–55)
ANION GAP SERPL CALC-SCNC: 9 MEQ/L
AST SERPL-CCNC: 12 UNIT/L (ref 5–34)
BASOPHILS # BLD AUTO: 0.01 X10(3)/MCL
BASOPHILS NFR BLD AUTO: 0.2 %
BILIRUB SERPL-MCNC: 0.4 MG/DL
BUN SERPL-MCNC: 19.8 MG/DL (ref 7–18.7)
CALCIUM SERPL-MCNC: 9.4 MG/DL (ref 8.4–10.2)
CHLORIDE SERPL-SCNC: 109 MMOL/L (ref 98–107)
CO2 SERPL-SCNC: 21 MMOL/L (ref 22–29)
CREAT SERPL-MCNC: 1.18 MG/DL (ref 0.55–1.02)
CREAT/UREA NIT SERPL: 17
EOSINOPHIL # BLD AUTO: 0.07 X10(3)/MCL (ref 0–0.9)
EOSINOPHIL NFR BLD AUTO: 1.4 %
ERYTHROCYTE [DISTWIDTH] IN BLOOD BY AUTOMATED COUNT: 15.5 % (ref 11.5–17)
GFR SERPLBLD CREATININE-BSD FMLA CKD-EPI: 59 ML/MIN/1.73/M2
GLOBULIN SER-MCNC: 2.9 GM/DL (ref 2.4–3.5)
GLUCOSE SERPL-MCNC: 95 MG/DL (ref 74–100)
HCT VFR BLD AUTO: 35.7 % (ref 37–47)
HGB BLD-MCNC: 11.5 G/DL (ref 12–16)
IMM GRANULOCYTES # BLD AUTO: 0.01 X10(3)/MCL (ref 0–0.04)
IMM GRANULOCYTES NFR BLD AUTO: 0.2 %
LYMPHOCYTES # BLD AUTO: 1.42 X10(3)/MCL (ref 0.6–4.6)
LYMPHOCYTES NFR BLD AUTO: 29.2 %
MCH RBC QN AUTO: 30.3 PG (ref 27–31)
MCHC RBC AUTO-ENTMCNC: 32.2 G/DL (ref 33–36)
MCV RBC AUTO: 93.9 FL (ref 80–94)
MONOCYTES # BLD AUTO: 0.49 X10(3)/MCL (ref 0.1–1.3)
MONOCYTES NFR BLD AUTO: 10.1 %
NEUTROPHILS # BLD AUTO: 2.86 X10(3)/MCL (ref 2.1–9.2)
NEUTROPHILS NFR BLD AUTO: 58.9 %
NRBC BLD AUTO-RTO: 0 %
PLATELET # BLD AUTO: 267 X10(3)/MCL (ref 130–400)
PMV BLD AUTO: 10.5 FL (ref 7.4–10.4)
POTASSIUM SERPL-SCNC: 4 MMOL/L (ref 3.5–5.1)
PROT SERPL-MCNC: 6.8 GM/DL (ref 6.4–8.3)
RBC # BLD AUTO: 3.8 X10(6)/MCL (ref 4.2–5.4)
SODIUM SERPL-SCNC: 139 MMOL/L (ref 136–145)
WBC # BLD AUTO: 4.86 X10(3)/MCL (ref 4.5–11.5)

## 2024-08-16 PROCEDURE — 36415 COLL VENOUS BLD VENIPUNCTURE: CPT

## 2024-08-16 PROCEDURE — 80053 COMPREHEN METABOLIC PANEL: CPT

## 2024-08-16 PROCEDURE — 85025 COMPLETE CBC W/AUTO DIFF WBC: CPT

## 2024-08-19 ENCOUNTER — TELEPHONE (OUTPATIENT)
Dept: RHEUMATOLOGY | Facility: CLINIC | Age: 44
End: 2024-08-19
Payer: MEDICAID

## 2024-08-19 DIAGNOSIS — R79.89 ELEVATED SERUM CREATININE: Primary | ICD-10-CM

## 2024-08-19 NOTE — TELEPHONE ENCOUNTER
SPOKE WITH PT INFORMED OF MESSAGE. PT VERBALIZED UNDERSTANDING. INSTRUCTED TO DRAW LAB ON THE WEEK OF  SEPT 16 AND AGAIN ON  THE WEEK OF NOV 11                 ----- Message from CHAUNCEY Oliveros sent at 8/19/2024 10:19 AM CDT -----  Please advise the patient that CBC is stable, creat has increased slightly, still elevated- please make sure she is hydrating well as her BUN was also slightly elevated- I do want to keep a close eye on her creat as we did increase dose of MTX- we can plan to repeat CMP in 4 weeks since she does not have a follow up for another 2 months, I want to repeat lab before this time, no fasting needed- we will call once reviewed, please make sure she repeats well hydrated with H20- otherwise all remaining lab clinically acceptable at this time. Please place a reminder in the computer for 4 week CMP

## 2024-09-16 ENCOUNTER — LAB VISIT (OUTPATIENT)
Dept: LAB | Facility: HOSPITAL | Age: 44
End: 2024-09-16
Attending: NURSE PRACTITIONER
Payer: MEDICAID

## 2024-09-16 ENCOUNTER — TELEPHONE (OUTPATIENT)
Dept: RHEUMATOLOGY | Facility: CLINIC | Age: 44
End: 2024-09-16
Payer: MEDICAID

## 2024-09-16 DIAGNOSIS — R79.89 ELEVATED SERUM CREATININE: ICD-10-CM

## 2024-09-16 LAB
ALBUMIN SERPL-MCNC: 4 G/DL (ref 3.5–5)
ALBUMIN/GLOB SERPL: 1.4 RATIO (ref 1.1–2)
ALP SERPL-CCNC: 63 UNIT/L (ref 40–150)
ALT SERPL-CCNC: 11 UNIT/L (ref 0–55)
ANION GAP SERPL CALC-SCNC: 6 MEQ/L
AST SERPL-CCNC: 14 UNIT/L (ref 5–34)
BILIRUB SERPL-MCNC: 0.5 MG/DL
BUN SERPL-MCNC: 20.6 MG/DL (ref 7–18.7)
CALCIUM SERPL-MCNC: 9.6 MG/DL (ref 8.4–10.2)
CHLORIDE SERPL-SCNC: 110 MMOL/L (ref 98–107)
CO2 SERPL-SCNC: 25 MMOL/L (ref 22–29)
CREAT SERPL-MCNC: 1.12 MG/DL (ref 0.55–1.02)
CREAT/UREA NIT SERPL: 18
GFR SERPLBLD CREATININE-BSD FMLA CKD-EPI: >60 ML/MIN/1.73/M2
GLOBULIN SER-MCNC: 2.9 GM/DL (ref 2.4–3.5)
GLUCOSE SERPL-MCNC: 82 MG/DL (ref 74–100)
POTASSIUM SERPL-SCNC: 4.6 MMOL/L (ref 3.5–5.1)
PROT SERPL-MCNC: 6.9 GM/DL (ref 6.4–8.3)
SODIUM SERPL-SCNC: 141 MMOL/L (ref 136–145)

## 2024-09-16 PROCEDURE — 80053 COMPREHEN METABOLIC PANEL: CPT

## 2024-09-16 PROCEDURE — 36415 COLL VENOUS BLD VENIPUNCTURE: CPT

## 2024-09-16 NOTE — TELEPHONE ENCOUNTER
Spoke to pt inform of lab work and message. Pt verbalized understanding                ----- Message from CHAUNCEY Oliveros sent at 9/16/2024 12:53 PM CDT -----  Please advise the patient that all lab are noted to be clinically acceptable and stable at this time, BUN still slightly elevated, please enc her to continue to hydrate well with H20- creat still slightly elevated but GFR >60- we will  continue to monitor at future lab draws

## 2024-10-16 NOTE — PROGRESS NOTES
"  Patient ID: [id number]     Chief Complaint: Psoriatic Arthritis (PT  STATED HAVING JESSY KNEES AND HIPS)    HPI:     Ena Mallory is a 44 y.o. female here today for a follow up of PsA.      Presents today for evaluation of PsA, referred by her PCP. Initially diagnosed with Psoriasis in 2002, after sinus surgery, she reports she was having a rash after her surgery and diagnosed after a bx at this time. She has not seen Derm in several years but most recently has been followed by Dr. Allen- last seen roughly 1 year ago. She reports he was managing her skin with topical agents but has never taken any oral or injection medications. She was given Gabapentin, Ultram and Mobic. She has been on Savella in the past as well for Fibro but could not afford to continue to take. She reports her last skin flare was roughly a few weeks ago, lower back and upper buttocks, states this is the first time her skin flares here- previously has been her feet/toes/knees and elbows- She was using Betamethasone topically and did help. She reports a lot of joint pain, mostly her hands/fingers, her knees (L>R)- has had previously arthroscopy on this knee but has continued pain and her left hip is also painful. She reports most recently she has been having more lower back pain, having a lot of pain "locking up" on her. She denies any back injury in the past. She cannot sleep on her back, has to sleep on her right side, occ wakes her up at times. She reports back pain worse with activity. 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 She has taken Ultram up to 4 times a day and is helpful, she states she only takes it once a week, only if she really needs it- not taking it qid.   She reports her knee will get red/warm/swollen at times, she also feels her hip will swell, hands and fingers will also swell. States left side is worse than right side. She does report dactylitis in her fingers and " toes at times, last occurred a few months ago. Painful to wear shoes when occurs. She does have pain and swelling behind her ankles. Morning stiffness lasting for roughly 1-2 hours. She reports she was getting steroid injections in her left knee, did not help much, relief was very short lived and has not completed in several years. She was previously followed by Ortho in 2009 here at [facility] and has not seen since.   She reports skin sensitivity when she goes out in the sun- will get sun burned easily but not worsening of rashes, also reports eyes are very sensitive when she goes in the sun. She does report oral ulcers in the past, last occurred a few weeks ago, very painful and has a hard time eating, denies any vaginal or nasal ulcerations, does not occur often. She also has a history of GI issues, has a lot of reflux and nausea, possibly related to NSAID use, takes Prilosec and a Probiotic daily and this helps- is not followed by GI. Denies bloody stools but does have a lot of abd bloating and gas.      April 2024: Here today for follow up. Received records from Dr. Allen- he was managing his Fibromyalgia, previous discussion in regards to oral meds for Psoriasis but never started.   Her toes will bother her off an on during the week- does report she had an episode of dactylitis since her last visit. She continues to have back pain, chronic, depends on her activity- just started PT. Morning stiffness lasting roughly 2 hours. She also suffers with headaches- she was previously followed by Neurology- last seen roughly 9 years ago, at this time they did discuss Biotox but she states she lost her insurance and was not able to follow back up- headaches several times a week (at least 2-3)- Excedrin Migraine does help- occ will have to take Ibuprofen after.  Does not have regular cycles anymore so not associated. Mother with history of Migraines.     July 2024: Here today for follow up. She has been taking MTX since April  2024- reports took all of May but did have a really bad sinus inufection in June and had to hold meds x 3 weks- she has resumed since and has tolerated well with no s/e. She denies noticing any red/warm/swollen joints, morning stiffness still lasting roughly 2 hours. She continues to have back pain- she is doing PT, has helped some- had to move to once a week due to family obligations with her son. Using Icy hot patches and does help. She denies any dactylitis at this time. She did have a rash on her hands/fingers/toes, states felt exactly like her feet- very itchy- lasted a few weeks- she was trying to keep her hands as moisturized as possible but with washing her hands frequently with work cotninued to dry out. She states she did have a slight rash to her lower back with rash but used topical and resolved quickly. She reports she is very tired, not sleeping well as her son is having issues and has not been sleeping therefore she has not been sleeping so feels a combination of symptoms contributing at this time. Migraines have continued- she does have an apt with Neurology in December 2024. Denies any oral ulcers in a while.     October 2024: Here today for follow up. She has continued MTX 20 mg po qweek with folic acid 1 mg po qd and tolerating well. She denies red/warm/swollen joints, back has been most bothersome recently as she has been off her meds now for 2 weeks- battling sinus infection- plans to go into [facility] today to be seen. Morning stiffness lasting 30- 1 hour some days. She denies any new rashes, she states she felt like rash was coming out in gluteal cleft but started using the cream and helped and has not returned- she was referred to Derm back in July but still no apt- will check on status. She also had a toe infection on the left that resolved but states now occurring on right toe- denies any ingrown nails that she is aware of, did try to clip the skin on the side of the nail to help but now  infected. She denies any dactylitis or enthesitis.     Denies any recent  hospitalizations since last visit.     Dr. Allen- Rheum in the past, last seen 1 year ago  [facility]   Dr. Arnold- Cardiology- [facility] Fol[facility] Tumor of left Thyroid- s/p Thyroidectomy () benign     Denies history of fevers, photosensitivity, nasal ulcers, h/o MI, stroke, seizures, h/o PE or DVT, Raynaud's phenomenon, uveitis, malignancies.   Family history of autoimmune disease: None.   Pregnancies: 2 Miscarriages: None.   Smoking: Quit , hx of social smoker, started around late - did not smoke daily     Social History     Tobacco Use   Smoking Status Former    Types: Cigarettes   Smokeless Tobacco Former        -------------------------------------    Allergy        Past Surgical History:   Procedure Laterality Date     SECTION      KNEE ARTHROSCOPY W/ ARTHROTOMY Left     SINUS SURGERY  2002    THYROIDECTOMY Left 2012    Umbical Hernia Repair N/A 2017       Review of patient's allergies indicates:   Allergen Reactions    Sulfa (sulfonamide antibiotics) Hives    Sulfasalazine Other (See Comments)    Adhesive tape-silicones Dermatitis and Rash       Current Outpatient Medications   Medication Instructions    albuterol (PROVENTIL/VENTOLIN HFA) 90 mcg/actuation inhaler 2 puffs, Every 4-6 hours PRN    buPROPion (WELLBUTRIN SR) 150 mg, 2 times daily    clobetasoL (TEMOVATE) 0.05 % cream Topical (Top), 2 times daily    famotidine (PEPCID) 40 mg, Daily    fluticasone propionate (FLONASE) 50 mcg/actuation nasal spray 1 spray, Daily    folic acid (FOLVITE) 1 mg, Oral, Daily, After food    medroxyPROGESTERone (DEPO-PROVERA) 150 mg, Every 3 months    methotrexate 2.5 MG Tab Take 8 tablets po once a week. (Ok to split the dose and take 4 tab in the am and 4 tab in the pm). Hold for infection or fever.    metoclopramide HCl (REGLAN) 5  mg, Before meals & nightly    metoprolol tartrate (LOPRESSOR) 25 mg, Daily    omeprazole (PRILOSEC) 40 mg, Daily    predniSONE (DELTASONE) 5 MG tablet Take 2 tab po qd x 3 days then take 1 tab po qd x 2 days prn PsA flare.    simvastatin (ZOCOR) 40 MG tablet 1 tablet, Nightly    traMADoL (ULTRAM) 50 mg, Every 6 hours       Social History     Socioeconomic History    Marital status:    Tobacco Use    Smoking status: Former     Types: Cigarettes    Smokeless tobacco: Former   Substance and Sexual Activity    Alcohol use: Not Currently     Comment: occ    Drug use: Not Currently    Sexual activity: Not Currently     Birth control/protection: Injection        Family History   Problem Relation Name Age of Onset    Chronic back pain Mother      Asthma Mother      Hypertension Father      Heart disease Father      Diabetes Mellitus Father      Diabetes Mellitus Brother      Hypertension Maternal Grandmother      Depression Maternal Grandmother      Cancer Maternal Grandmother      COPD Maternal Grandfather      Cancer Paternal Grandmother          Immunization History   Administered Date(s) Administered    COVID-19 Vaccine 03/12/2022    COVID-19, MRNA, LN-S, PF (MODERNA FULL 0.5 ML DOSE) 03/12/2022    Influenza - Quadrivalent - PF *Preferred* (6 months and older) 11/27/2013    Influenza - Trivalent - Fluarix, Flulaval, Fluzone, Afluria - PF 11/27/2013    Pneumococcal Conjugate - 7 Valent 11/27/2013    Pneumococcal Polysaccharide - 23 Valent 11/27/2013    Tdap 11/27/2013       Patient Care Team:  Davi Reyes MD as PCP - General (Family Medicine)     Subjective:     ROS    Constitutional:  Denies chills. Denies fever. Admits night sweats. Denies weight loss. Denies sleep disturbance.   Ophthalmology: Denies blurred vision. Denies diminished visual acuity. Admits dry eyes- uses gtts and helps. Denies eye pain. Admits Burning and redness- occurred in the past. Denies itching   ENT: Denies decreased hearing.  "Denies oral ulcers. Denies epistaxis. Denies swelling of ears or throat. Denies dry mouth. Denies swollen glands.   Endocrine: Denies diabetes. Admits thyroid Problems.   Respiratory: Admits cough. Denies shortness of breath. Denies shortness of breath with exertion. Denies hemoptysis.   Cardiovascular: Denies chest pain at rest. Denies chest pain with exertion. Denies Irregular heartbeat. Admits palpitations- hx of PACs- follows with Dr. Arnold. Denies edema. Denies orthopnea.   Gastrointestinal: Denies abdominal pain. Denies diarrhea. Denies nausea. Denies vomiting. Denies hematemesis or hematochezia. Denies change in appetite. Admits heartburn and bloating but has improved.   Genitourinary: Denies dysuria. Denies urinary frequency. Denies urinary urgency. Denies blood in urine.  Musculoskeletal: See HPI for details  Integumentary: Admits rash. Denies Itching. Denies dry skin. Denies photosensitivity.   Peripheral Vascular: Denies Ulcers of hands and/or feet. Denies Cold extremities.   Neurologic: Denies dizziness. Admits headache. Denies difficulty speaking. Denies loss of strength Denies numbness or tingling.   Psychiatric: Denies depression and anxiety- currently controlled with meds. Denies suicidal/homicidal ideations.      Objective:     Visit Vitals  /80 (BP Location: Left arm, Patient Position: Sitting)   Pulse 83   Temp 98.4 °F (36.9 °C) (Oral)   Resp 20   Ht 4' 11" (1.499 m)   Wt 63 kg (139 lb)   SpO2 99%   BMI 28.07 kg/m²         Physical Exam    General Appearance: alert, pleasant, in no acute distress.  Skin: Skin color, texture, turgor normal. No rashes or lesions. Spider veins noted to bilateral lower extremities. Paronychia noted to right great toe- erythematous, tender, no discharge noted, swollen  Eyes:  extraocular movement intact (EOMI), pupils equal, round, reactive to light and accommodation, conjunctiva clear.  ENT: No oral or nasal ulcers.  Neck:  Neck supple. No adenopathy.   Lungs: " CTA bilaterally without crackles, rhonchi, or wheezes.   Heart: RRR w/o murmurs.  No edema. 2+ DP pulse.  Neuro: Alert, oriented, CN II-XII GI, sensory and motor innervation intact.  Musculoskeletal: No synovitis noted to bilateral MCPs- mild tenderness to several PIPs today, FROM with no pain to bilateral wrists, elbows and shoulders, FROM noted to knees with crepitus noted bilaterally (L>R), mild tenderness to bilateral ankles and MTPs- today has resolved, unable to assess nail beds as her nails and toe nails are painted, no dactylitis or enthesitis noted   Psych: Alert, oriented, normal eye contact.       Labs Reviewed:   1/5/2023 Vitamin D 20.1.CMP ok, CBC ok.     3/25/2024 Hep B Surface Ab Reactive, CMP ok. CBC ok. Hep B surface Ag, Core Ab/C/HIV all negative, CRP 4.50 (<5), Sed Rate 2 (<20). HLA B27 negative. SANTA negative. CCP negative. RF negative, Quantiferon negative     5/30/2024 CMP ok, CBC hgb 11, hct 34.6 (drop from previous), otherwise ok.     07/17/2024 CMP creatinine 1.06, GFR> 60, otherwise okay, CBC okay, CRP 1.30 (<5), ESR 1 (<20)    8/16/2024 CMP Creat 1.18 GFR 59, CBC stable- repeat CMP in 1 week since not coming in for another 4 weeks- needs to hydrate well     09/16/2024 CMP creatinine 1.12, BUN 20, GFR> 60- monitor at f/u 's  Rheumatology:  Lab Results   Component Value Date    ANAHS <1:80 (Negative) 03/25/2024    [id number] Negative 03/25/2024    INTER SEE COMMENTS 03/25/2024    SEDRATE 1 07/17/2024        Chemistry:  Lab Results   Component Value Date     09/16/2024    K 4.6 09/16/2024    BUN 20.6 (H) 09/16/2024    CREATININE 1.12 (H) 09/16/2024    EGFRNORACEVR >60 09/16/2024    GLUCOSE 82 09/16/2024    CALCIUM 9.6 09/16/2024    ALKPHOS 63 09/16/2024    LABPROT 6.9 09/16/2024    ALBUMIN 4.0 09/16/2024    AST 14 09/16/2024    ALT 11 09/16/2024        Hematology:  Lab Results   Component Value Date    WBC 4.86 08/16/2024    HGB 11.5 (L) 08/16/2024    HCT 35.7 (L) 08/16/2024      "08/16/2024        Urine:  No results found for: "COLORUA", "APPEARANCEUA", "SGUA", "PHUA", "PROTEINUA", "GLUCOSEUA", "KETONESUA", "BLOODUA", "NITRITESUA", "LEUKOCYTESUR", "RBCUA", "WBCUA", "BACTERIA", "SQEPUA", "HYALINECASTS", "CREATRANDUR", "PROTEINURINE", "UPROTCREA"     No results found for: "PROTEINURINE", "CREATRANDUR", "UPROTCREA"     Imaging:   3/25/2024 CXRay: Impression: No radiographic evidence of an acute cardiopulmonary process.  Left and Right Foot Xray: Impression: No acute abnormalities are seen  Left and Right hand Xray: Impression: No acute abnormalities are seen  Left and Right Knee Xray: Impression:  No acute abnormalities are seen  Left and Right Hip Xray: Impression: No acute osseous abnormality.  Lspine: FINDINGS: Degenerative changes are identified with narrowing and some sclerosis and marginal osteophytosis at L2-L3 some degenerative changes seen of the posterior elements at L4-L5 and L5-S1. Impression: Degenerative changes. No clear evidence of instability  SI Joint Xray: Impression:  No acute osseous abnormality.      Assessment:       ICD-10-CM ICD-9-CM   1. Psoriatic arthritis  L40.50 696.0   2. Psoriasis  L40.9 696.1   3. High risk medication use  Z79.899 V58.69   4. Drug-induced immunodeficiency  D84.821 279.3    Z79.899 E947.9   5. Chronic midline low back pain with left-sided sciatica  M54.42 724.2    G89.29 724.3     338.29   6. Fibromyalgia  M79.7 729.1   7. Hypothyroidism, unspecified type  E03.9 244.9   8. Primary hypertension  I10 401.9   9. Migraine with aura and without status migrainosus, not intractable  G43.109 346.00         Plan:     1. Psoriasis/Psoriatic arthritis  Diagnosed with Psoriasis in 2002 after skin biopsy- was developing rashes after sinus surgery and bx confirmed Psoriasis- she has not seen Derm in several years and was referred to Rheumatology Dr. Allen- has not seen in the past 1 year. She denies taking any meds for Psoriasis besides topicals, no orals or " injections. She reports she was being managed with Ultram and Mobic. She gives a history of pain in her hands, knees, hips and toes with morning stiffness lasting 2 hours, also gives a history of dactylitis and enthesitis in the past. Last rash occurred a few weeks ago, first time occurred in lower back/upper buttocks area, previously on knees, feet and elbows- rash of feet noted on phone and Psoriatic in nature.  Today on exam, mild tenderness to several PIPs, no dactylitis, enthesitis. No active rashes noted today.   - Continue MTX to 20 mg po qweek with folic acid 1 mg po qd- she has held due to sinus infection as well as toenail infection- this is her 2nd infection while taking- she will resume once infections cleared- if any issues with infections in the future we will consider change in therapy   - On Depo with her GYN   - Infection w/u negative 4/2024  - Cxray ok 4/2024  - Has apt with Ophthalmology 2/2025  - No labs today as she just had completed 8/16/2024 and 9/16/2024- will come by in the next 5-6 weeks and collect lab at this time - orders placed     2. High Risk Med use/Drug Induced Immunodeficency  - Persons with rheumatoid arthritis, lupus, psoriatic arthritis and other autoimmune diseases are at increased risk of cardiovascular disease including heart attack and stroke. We recommend that all patients with these conditions have annual health maintenance exams including lipid measurements, blood pressure measurements, and smoking cessation counseling when applicable at their primary care provider's office.  - Pap/Mammogram UTD with GYN  - Advised to stay up-to-date on age appropriate vaccinations and malignancy screening with PCP.   - Continued lab monitoring.      3.  Chronic midline low back pain with left-sided sciatica  - April 2024 Lspine: FINDINGS: Degenerative changes are identified with narrowing and some sclerosis and marginal osteophytosis at L2-L3 some degenerative changes seen of the  posterior elements at L4-L5 and L5-S1. Impression: Degenerative changes. No clear evidence of instability  - Enc stretches, started PT and tolerating well, has found helpful- 8/5/2024- transitioned to independent phase of rehab    4. Fibromyalgia  - No issues noted at this time   - She was advised that we do no manage Fibro here, that we will continue to defer treatment to PCP     5. Hypothyroidism/HTN  - Followed and managed by PCP, currently stable today, enc low Na+ diet  - Diet modifications and enc to cut back on use of NSAIDs- she defers GI referral at this time, will consider in the future.     7. Migraines  - Migraines for several years- was previously followed by Neurology (cannot recall name)- discussed possible Botox but lost her insurance and was not able to follow up- has been managing with Excedrin Migraine and Motrin/Aleve- reports light sensitivity, has to lay down in dark room with no lights, nausea, occ vomiting and dizziness associated with headaches- occurs roughly 2-3 times a week- OTC meds do help some days- would like referral back to Neurology- possibly to help decrease use of NSAIDs with treatment  - Referral sent to Neurology- has apt 12/2024 with Dr. Medellin    8. Elevated serum Creatinine  - Monitoring closely with MTX use however she is taking Excedrin Migraine often, this is most likely also contributing- she does have an upcoming apt with Neuro- enc to use Tylenol prn and try to avoid NSAIDs- continue to hydrate well with H20     9. Cervical Pain  - Will obtain Cspine Xrays for eval    10. Paronychia Right Great toe  - Doxycycline 100 mg p.o. b.i.d. x 10 days   - Mupirocin topical t.i.d. times 10 days  - If no relief advised to follow-up with PCP, if continues to be an issue we will consider referral to podiatrist in the future    No follow-ups on file. In addition to their scheduled follow up, the patient has also been instructed to follow up on as needed basis.     Total time spent with  patient and documentation is 40 minutes. All questions were answered to patient's satisfaction and patient verbalized understanding. This includes face to face time and non-face to face time preparing to see the patient (eg, review of tests), obtaining and/or reviewing separately obtained history, documenting clinical information in the electronic or other health record, independently interpreting results and communicating results to the patient/family/caregiver, or care coordinator.      CHAUNCEY Oliveros    No orders of the defined types were placed in this encounter.

## 2024-10-17 ENCOUNTER — OFFICE VISIT (OUTPATIENT)
Dept: RHEUMATOLOGY | Facility: CLINIC | Age: 44
End: 2024-10-17
Payer: MEDICAID

## 2024-10-17 VITALS
WEIGHT: 139 LBS | TEMPERATURE: 98 F | SYSTOLIC BLOOD PRESSURE: 120 MMHG | DIASTOLIC BLOOD PRESSURE: 80 MMHG | BODY MASS INDEX: 28.02 KG/M2 | HEIGHT: 59 IN | HEART RATE: 83 BPM | RESPIRATION RATE: 20 BRPM | OXYGEN SATURATION: 99 %

## 2024-10-17 DIAGNOSIS — Z79.899 DRUG-INDUCED IMMUNODEFICIENCY: ICD-10-CM

## 2024-10-17 DIAGNOSIS — G89.29 CHRONIC MIDLINE LOW BACK PAIN WITH LEFT-SIDED SCIATICA: ICD-10-CM

## 2024-10-17 DIAGNOSIS — M54.42 CHRONIC MIDLINE LOW BACK PAIN WITH LEFT-SIDED SCIATICA: ICD-10-CM

## 2024-10-17 DIAGNOSIS — M79.7 FIBROMYALGIA: ICD-10-CM

## 2024-10-17 DIAGNOSIS — L03.031 PARONYCHIA OF GREAT TOE, RIGHT: ICD-10-CM

## 2024-10-17 DIAGNOSIS — I10 PRIMARY HYPERTENSION: ICD-10-CM

## 2024-10-17 DIAGNOSIS — L40.9 PSORIASIS: ICD-10-CM

## 2024-10-17 DIAGNOSIS — Z79.899 HIGH RISK MEDICATION USE: ICD-10-CM

## 2024-10-17 DIAGNOSIS — G43.109 MIGRAINE WITH AURA AND WITHOUT STATUS MIGRAINOSUS, NOT INTRACTABLE: ICD-10-CM

## 2024-10-17 DIAGNOSIS — E03.9 HYPOTHYROIDISM, UNSPECIFIED TYPE: ICD-10-CM

## 2024-10-17 DIAGNOSIS — D84.821 DRUG-INDUCED IMMUNODEFICIENCY: ICD-10-CM

## 2024-10-17 DIAGNOSIS — L40.50 PSORIATIC ARTHRITIS: Primary | ICD-10-CM

## 2024-10-17 DIAGNOSIS — M54.2 CERVICAL PAIN: ICD-10-CM

## 2024-10-17 PROCEDURE — 1160F RVW MEDS BY RX/DR IN RCRD: CPT | Mod: CPTII,,, | Performed by: NURSE PRACTITIONER

## 2024-10-17 PROCEDURE — 1159F MED LIST DOCD IN RCRD: CPT | Mod: CPTII,,, | Performed by: NURSE PRACTITIONER

## 2024-10-17 PROCEDURE — 99214 OFFICE O/P EST MOD 30 MIN: CPT | Mod: PBBFAC | Performed by: NURSE PRACTITIONER

## 2024-10-17 PROCEDURE — 3074F SYST BP LT 130 MM HG: CPT | Mod: CPTII,,, | Performed by: NURSE PRACTITIONER

## 2024-10-17 PROCEDURE — 99215 OFFICE O/P EST HI 40 MIN: CPT | Mod: S$PBB,,, | Performed by: NURSE PRACTITIONER

## 2024-10-17 PROCEDURE — 3079F DIAST BP 80-89 MM HG: CPT | Mod: CPTII,,, | Performed by: NURSE PRACTITIONER

## 2024-10-17 PROCEDURE — 3008F BODY MASS INDEX DOCD: CPT | Mod: CPTII,,, | Performed by: NURSE PRACTITIONER

## 2024-10-17 RX ORDER — DOXYCYCLINE 100 MG/1
100 CAPSULE ORAL 2 TIMES DAILY
Qty: 20 CAPSULE | Refills: 0 | Status: SHIPPED | OUTPATIENT
Start: 2024-10-17

## 2024-10-17 RX ORDER — FOLIC ACID 1 MG/1
1 TABLET ORAL DAILY
Qty: 30 TABLET | Refills: 5 | Status: SHIPPED | OUTPATIENT
Start: 2024-10-17 | End: 2025-04-15

## 2024-10-17 RX ORDER — FLUTICASONE PROPIONATE 50 MCG
1 SPRAY, SUSPENSION (ML) NASAL DAILY
COMMUNITY

## 2024-10-17 RX ORDER — MUPIROCIN 20 MG/G
OINTMENT TOPICAL 3 TIMES DAILY
Qty: 15 G | Refills: 0 | Status: SHIPPED | OUTPATIENT
Start: 2024-10-17

## 2024-10-17 RX ORDER — METHOTREXATE 2.5 MG/1
TABLET ORAL
Qty: 40 TABLET | Refills: 2 | Status: SHIPPED | OUTPATIENT
Start: 2024-10-17

## 2024-11-11 ENCOUNTER — TELEPHONE (OUTPATIENT)
Dept: RHEUMATOLOGY | Facility: CLINIC | Age: 44
End: 2024-11-11
Payer: MEDICAID

## 2024-11-11 NOTE — TELEPHONE ENCOUNTER
I ATTEMPT TO CALL PT NO ANSWER LEFT A VOICEMAIL MESSAGE TO CALL CLINIC BACK  reminder of lab due this week                ----- Message from Nurse Singh sent at 8/19/2024  2:04 PM CDT -----  Regarding: REMINDER OF LAB WORK  REMINDER OF LAB WORK THE WEEK OF  NOV. 11

## 2024-11-14 ENCOUNTER — LAB VISIT (OUTPATIENT)
Dept: LAB | Facility: HOSPITAL | Age: 44
End: 2024-11-14
Attending: NURSE PRACTITIONER
Payer: MEDICAID

## 2024-11-14 ENCOUNTER — TELEPHONE (OUTPATIENT)
Dept: RHEUMATOLOGY | Facility: CLINIC | Age: 44
End: 2024-11-14
Payer: MEDICAID

## 2024-11-14 ENCOUNTER — PATIENT MESSAGE (OUTPATIENT)
Dept: RHEUMATOLOGY | Facility: CLINIC | Age: 44
End: 2024-11-14
Payer: MEDICAID

## 2024-11-14 DIAGNOSIS — Z79.899 HIGH RISK MEDICATION USE: ICD-10-CM

## 2024-11-14 DIAGNOSIS — D84.821 DRUG-INDUCED IMMUNODEFICIENCY: ICD-10-CM

## 2024-11-14 DIAGNOSIS — L40.9 PSORIASIS: ICD-10-CM

## 2024-11-14 DIAGNOSIS — Z79.899 DRUG-INDUCED IMMUNODEFICIENCY: ICD-10-CM

## 2024-11-14 DIAGNOSIS — L40.50 PSORIATIC ARTHRITIS: Primary | ICD-10-CM

## 2024-11-14 DIAGNOSIS — L40.50 PSORIATIC ARTHRITIS: ICD-10-CM

## 2024-11-14 LAB
ALBUMIN SERPL-MCNC: 4.2 G/DL (ref 3.5–5)
ALBUMIN/GLOB SERPL: 1.4 RATIO (ref 1.1–2)
ALP SERPL-CCNC: 68 UNIT/L (ref 40–150)
ALT SERPL-CCNC: 12 UNIT/L (ref 0–55)
ANION GAP SERPL CALC-SCNC: 8 MEQ/L
AST SERPL-CCNC: 12 UNIT/L (ref 5–34)
BASOPHILS # BLD AUTO: 0.03 X10(3)/MCL
BASOPHILS NFR BLD AUTO: 0.5 %
BILIRUB SERPL-MCNC: 0.6 MG/DL
BUN SERPL-MCNC: 19.4 MG/DL (ref 7–18.7)
CALCIUM SERPL-MCNC: 9.7 MG/DL (ref 8.4–10.2)
CHLORIDE SERPL-SCNC: 107 MMOL/L (ref 98–107)
CO2 SERPL-SCNC: 24 MMOL/L (ref 22–29)
CREAT SERPL-MCNC: 1.18 MG/DL (ref 0.55–1.02)
CREAT/UREA NIT SERPL: 16
CRP SERPL-MCNC: 2.3 MG/L
EOSINOPHIL # BLD AUTO: 0.08 X10(3)/MCL (ref 0–0.9)
EOSINOPHIL NFR BLD AUTO: 1.3 %
ERYTHROCYTE [DISTWIDTH] IN BLOOD BY AUTOMATED COUNT: 15.3 % (ref 11.5–17)
ERYTHROCYTE [SEDIMENTATION RATE] IN BLOOD: 5 MM/HR (ref 0–20)
GFR SERPLBLD CREATININE-BSD FMLA CKD-EPI: 59 ML/MIN/1.73/M2
GLOBULIN SER-MCNC: 3.1 GM/DL (ref 2.4–3.5)
GLUCOSE SERPL-MCNC: 93 MG/DL (ref 74–100)
HCT VFR BLD AUTO: 38.6 % (ref 37–47)
HGB BLD-MCNC: 12.4 G/DL (ref 12–16)
IMM GRANULOCYTES # BLD AUTO: 0.03 X10(3)/MCL (ref 0–0.04)
IMM GRANULOCYTES NFR BLD AUTO: 0.5 %
LYMPHOCYTES # BLD AUTO: 2.06 X10(3)/MCL (ref 0.6–4.6)
LYMPHOCYTES NFR BLD AUTO: 34.2 %
MCH RBC QN AUTO: 29.5 PG (ref 27–31)
MCHC RBC AUTO-ENTMCNC: 32.1 G/DL (ref 33–36)
MCV RBC AUTO: 91.7 FL (ref 80–94)
MONOCYTES # BLD AUTO: 0.49 X10(3)/MCL (ref 0.1–1.3)
MONOCYTES NFR BLD AUTO: 8.1 %
NEUTROPHILS # BLD AUTO: 3.34 X10(3)/MCL (ref 2.1–9.2)
NEUTROPHILS NFR BLD AUTO: 55.4 %
NRBC BLD AUTO-RTO: 0 %
PLATELET # BLD AUTO: 249 X10(3)/MCL (ref 130–400)
PMV BLD AUTO: 10 FL (ref 7.4–10.4)
POTASSIUM SERPL-SCNC: 3.9 MMOL/L (ref 3.5–5.1)
PROT SERPL-MCNC: 7.3 GM/DL (ref 6.4–8.3)
RBC # BLD AUTO: 4.21 X10(6)/MCL (ref 4.2–5.4)
SODIUM SERPL-SCNC: 139 MMOL/L (ref 136–145)
WBC # BLD AUTO: 6.03 X10(3)/MCL (ref 4.5–11.5)

## 2024-11-14 PROCEDURE — 85025 COMPLETE CBC W/AUTO DIFF WBC: CPT

## 2024-11-14 PROCEDURE — 86140 C-REACTIVE PROTEIN: CPT

## 2024-11-14 PROCEDURE — 80053 COMPREHEN METABOLIC PANEL: CPT

## 2024-11-14 PROCEDURE — 36415 COLL VENOUS BLD VENIPUNCTURE: CPT

## 2024-11-14 PROCEDURE — 85652 RBC SED RATE AUTOMATED: CPT

## 2024-11-14 RX ORDER — ADALIMUMAB 40MG/0.4ML
40 KIT SUBCUTANEOUS
Qty: 2 PEN | Refills: 11 | Status: SHIPPED | OUTPATIENT
Start: 2024-11-14 | End: 2025-02-06

## 2024-11-14 NOTE — TELEPHONE ENCOUNTER
----- Message from Bertha sent at 11/14/2024 10:56 AM CST -----  Pt presented @ window stating that she is on antibiotics again and is unable to take the methotrexate and she was told if this happens that she needs to contact the clinic for a medication switch. Pt can be reached @ [phone]

## 2024-11-14 NOTE — TELEPHONE ENCOUNTER
Spoke with patient states she has been constantly sick, she states she was told during her last visit to call to see if medication could be changed.   Please advise.

## 2024-11-14 NOTE — TELEPHONE ENCOUNTER
Counseled patient on Humira. Plan to further demonstrate Humira administration when patient picks up her medication from [facility]

## 2024-11-14 NOTE — TELEPHONE ENCOUNTER
Please let her know I did send Rx here as it is a speciality medication if she wishes to have sent else where just let me know, thanks!

## 2024-11-18 ENCOUNTER — DOCUMENTATION ONLY (OUTPATIENT)
Dept: RHEUMATOLOGY | Facility: CLINIC | Age: 44
End: 2024-11-18
Payer: MEDICAID

## 2024-11-18 NOTE — PROGRESS NOTES
SARA STONE   (Key: [id number])  Humira (2 Pen) (CF) 40MG/0.4ML auto-injector kit  [place] approved til  05/17/2025

## 2024-12-02 NOTE — PROGRESS NOTES
[facility] Initial Office Visit Note    Initial Visit Date: 12/4/2024  Current Visit Date:  12/04/2024    Chief Complaint:     Chief Complaint   Patient presents with    Headache     Patient states she has 4 headaches 4 days out of the week.        History of Present Illness:      This is 44 y.o. female with history of hypertension, hypothyroidism, asthma, depression, psoriatic arthritis on Humira, MTX, obesity, renal stones, CRISTAL who is referred for headache disorder.    Age of Onset : teens    Headache Description: bifrontal, temporal, pounding, severe, lasting 24 hours, with nausea, and photophobia and phonophobia, associated with dizziness.     Frequency: 16 migraine headache days per month for 5+ years.    Provocation Factors: stress.     Risk Factors  - Family history of headache disorder: Yes mom and 1 boy with migraine headache.  - History of focal CNS lesions: No  - History of CNS infections: No  - History head trauma: No  - History of underlying mood disorder: Yes Depression.   - History of sleep disorder: Yes not sleeping well at night.  - Recreational drug use: No  - Tobacco use: No  - Alcohol use: No  - Weight fluctuation: Yes lost 6 lbs   - Isotretinoin or Tetracycline use:  Not Applicable  - Family planning and contraceptive use: Yes IM Depo-Provera    Medications:     Current Prophylactic  Metoprolol 25 mg daily: for PACs.   Wellbutrin 150 mg twice a day: partially effective.     Current Abortive  Excedrin: partially effective   Tramadol: partially effective     Prior Prophylactic  Effexor (1/2022 to 6/2022): ineffective     Prior Abortive  Fioricet PRN  Reglan as needed       Devices:     - VNS:  - TNS  - TMS:     Procedures:     - Botox:  - PSG block:   - Occipital nerve block:     Labs:     Results for orders placed or performed in visit on 11/14/24   Comprehensive Metabolic Panel    Collection Time: 11/14/24 10:54 AM   Result Value Ref Range    Sodium 139 136 - 145 mmol/L    Potassium 3.9 3.5  - 5.1 mmol/L    Chloride 107 98 - 107 mmol/L    CO2 24 22 - 29 mmol/L    Glucose 93 74 - 100 mg/dL    Blood Urea Nitrogen 19.4 (H) 7.0 - 18.7 mg/dL    Creatinine 1.18 (H) 0.55 - 1.02 mg/dL    Calcium 9.7 8.4 - 10.2 mg/dL    Protein Total 7.3 6.4 - 8.3 gm/dL    Albumin 4.2 3.5 - 5.0 g/dL    Globulin 3.1 2.4 - 3.5 gm/dL    Albumin/Globulin Ratio 1.4 1.1 - 2.0 ratio    Bilirubin Total 0.6 <=1.5 mg/dL    ALP 68 40 - 150 unit/L    ALT 12 0 - 55 unit/L    AST 12 5 - 34 unit/L    eGFR 59 mL/min/1.73/m2    Anion Gap 8.0 mEq/L    BUN/Creatinine Ratio 16    C-Reactive Protein    Collection Time: 11/14/24 10:54 AM   Result Value Ref Range    CRP 2.30 <5.00 mg/L   Sedimentation rate    Collection Time: 11/14/24 10:54 AM   Result Value Ref Range    Sed Rate 5 0 - 20 mm/hr   CBC with Differential    Collection Time: 11/14/24 10:54 AM   Result Value Ref Range    WBC 6.03 4.50 - 11.50 x10(3)/mcL    RBC 4.21 4.20 - 5.40 x10(6)/mcL    Hgb 12.4 12.0 - 16.0 g/dL    Hct 38.6 37.0 - 47.0 %    MCV 91.7 80.0 - 94.0 fL    MCH 29.5 27.0 - 31.0 pg    MCHC 32.1 (L) 33.0 - 36.0 g/dL    RDW 15.3 11.5 - 17.0 %    Platelet 249 130 - 400 x10(3)/mcL    MPV 10.0 7.4 - 10.4 fL    Neut % 55.4 %    Lymph % 34.2 %    Mono % 8.1 %    Eos % 1.3 %    Basophil % 0.5 %    Lymph # 2.06 0.6 - 4.6 x10(3)/mcL    Neut # 3.34 2.1 - 9.2 x10(3)/mcL    Mono # 0.49 0.1 - 1.3 x10(3)/mcL    Eos # 0.08 0 - 0.9 x10(3)/mcL    Baso # 0.03 <=0.2 x10(3)/mcL    IG# 0.03 0 - 0.04 x10(3)/mcL    IG% 0.5 %    NRBC% 0.0 %       Studies:     - MRI Brain:   - MRA Head w/o Chadd:   - MRV Head w/o Chadd:   - NCHCT:  - Lumbar Puncture:    Review of Systems:     Review of Systems   All other systems reviewed and are negative.      Physical Exams:     Vitals:    12/04/24 1023   BP: 106/73   Pulse: 72   Resp: 18   Temp: 99.3 °F (37.4 °C)       Physical Exam  Vitals and nursing note reviewed.   Constitutional:       Appearance: Normal appearance.   HENT:      Head: Normocephalic and atraumatic.       Nose: Nose normal.      Mouth/Throat:      Mouth: Mucous membranes are moist.      Pharynx: Oropharynx is clear.   Eyes:      Conjunctiva/sclera: Conjunctivae normal.   Cardiovascular:      Rate and Rhythm: Normal rate and regular rhythm.      Pulses: Normal pulses.   Pulmonary:      Effort: Pulmonary effort is normal.      Breath sounds: Normal breath sounds.   Abdominal:      General: Abdomen is flat.   Musculoskeletal:         General: Normal range of motion.      Cervical back: Normal range of motion.   Skin:     General: Skin is warm.   Neurological:      Mental Status: She is alert.         Comprehensive Neurological Exam:  Mental Status: Alert Oriented to Self, Date, and Place. Comprehension wnl. No dysarthria.   CN II - XII: GUANACO, No APD, Fundus wnl OU,  VFFC, No ptosis OU, EOMI without nystagmus LT/Temp symmetric in CN V1-3 distribution, Hearing grossly intact, Face Symmetric, Tongue and Uvula midline, Trapezius symmetric bilateral.   Motor: tone and bulk wnl throughout, no abnormal involuntary or voluntary movements, 5/5 to confrontation, Fine finger movements wnl b/l, No pronator drift.   Sensory: LT, Proprioception, Vibration, PP, Temp symmetric.   Reflexes: 2+ throughout, plantar reflexes downward bilateral.   Cerebellar: FNF wnl b/l, RAHM wnl b/l  Romberg: Negative  Gait: normal.     Assessment:     This is 44 y.o. female with history of hypertension, PAC, overweight,  hypothyroidism, asthma, depression, psoriatic arthritis on Humira, MTX  who is referred for intractable chronic migraine without aura, no medication overuse headache.     Problem List Items Addressed This Visit          Cardiac/Vascular    Hypertension     Other Visit Diagnoses       Intractable chronic migraine without aura and with status migrainosus    -  Primary    Relevant Medications    galcanezumab-gnlm (EMGALITY PEN) 120 mg/mL PnIj    ubrogepant (UBRELVY) 100 mg tablet    PAC (premature atrial contraction)        History of  renal stone        BMI 28.0-28.9,adult        CRISTAL (acute kidney injury)                Plan:     [] start Galcanezumab 120 mg once per month - will avoid topiramate given history of renal stones, and depakote due to potential for weight gain. Have failed metoprolol and venlafaxine.   [] start Ubrogepant 100 mg twice a day as needed: triptans are contraindicated in patient with cardiac arrhythmia and hypertension.     RTC 4 Months     Headache education provided: good sleep hygiene and 7 hours of sleep per night, stress management, medication overuse education provided. Using more 3 OTC per week may worsen headaches, high intensity interval training has shown to reduce headache frequency. Low carb, high protein has shown to reduce headache frequency. Patient is instructed in keep headache diary.     I have explained the treatment plan, diagnosis, and prognosis to patient. All questions are answered to the best of my knowledge.     Visit today is associated with current or anticipated ongoing medical care related to this patient's single serious condition/complex condition as documented above.     Face to face time 60 minutes, including documentation, chart review, counseling, education, review of test results, relevant medical records, and coordination of care.       Isa Thorne MD   General Neurology  12/04/2024

## 2024-12-04 ENCOUNTER — OFFICE VISIT (OUTPATIENT)
Dept: NEUROLOGY | Facility: CLINIC | Age: 44
End: 2024-12-04
Payer: MEDICAID

## 2024-12-04 VITALS
HEART RATE: 72 BPM | DIASTOLIC BLOOD PRESSURE: 73 MMHG | BODY MASS INDEX: 28.39 KG/M2 | RESPIRATION RATE: 18 BRPM | HEIGHT: 59 IN | SYSTOLIC BLOOD PRESSURE: 106 MMHG | OXYGEN SATURATION: 99 % | TEMPERATURE: 99 F | WEIGHT: 140.81 LBS

## 2024-12-04 DIAGNOSIS — G43.711 INTRACTABLE CHRONIC MIGRAINE WITHOUT AURA AND WITH STATUS MIGRAINOSUS: Primary | ICD-10-CM

## 2024-12-04 DIAGNOSIS — I49.1 PAC (PREMATURE ATRIAL CONTRACTION): ICD-10-CM

## 2024-12-04 DIAGNOSIS — Z87.442 HISTORY OF RENAL STONE: ICD-10-CM

## 2024-12-04 DIAGNOSIS — N17.9 AKI (ACUTE KIDNEY INJURY): ICD-10-CM

## 2024-12-04 DIAGNOSIS — I10 PRIMARY HYPERTENSION: ICD-10-CM

## 2024-12-04 PROCEDURE — 99215 OFFICE O/P EST HI 40 MIN: CPT | Mod: PBBFAC | Performed by: PSYCHIATRY & NEUROLOGY

## 2024-12-04 PROCEDURE — 3078F DIAST BP <80 MM HG: CPT | Mod: CPTII,,, | Performed by: PSYCHIATRY & NEUROLOGY

## 2024-12-04 PROCEDURE — 1159F MED LIST DOCD IN RCRD: CPT | Mod: CPTII,,, | Performed by: PSYCHIATRY & NEUROLOGY

## 2024-12-04 PROCEDURE — 99205 OFFICE O/P NEW HI 60 MIN: CPT | Mod: S$PBB,,, | Performed by: PSYCHIATRY & NEUROLOGY

## 2024-12-04 PROCEDURE — 3008F BODY MASS INDEX DOCD: CPT | Mod: CPTII,,, | Performed by: PSYCHIATRY & NEUROLOGY

## 2024-12-04 PROCEDURE — G2211 COMPLEX E/M VISIT ADD ON: HCPCS | Mod: S$PBB,,, | Performed by: PSYCHIATRY & NEUROLOGY

## 2024-12-04 PROCEDURE — 3074F SYST BP LT 130 MM HG: CPT | Mod: CPTII,,, | Performed by: PSYCHIATRY & NEUROLOGY

## 2024-12-04 RX ORDER — GALCANEZUMAB 120 MG/ML
120 INJECTION, SOLUTION SUBCUTANEOUS
Qty: 1 ML | Refills: 5 | Status: SHIPPED | OUTPATIENT
Start: 2024-12-04 | End: 2025-06-02

## 2025-01-22 NOTE — PROGRESS NOTES
Patient ID: [id number]     Chief Complaint: No on Humira, no one called to let her know it was approved, pain in hands, rashes on feet and back.     This is a real-time telephone call that was performed with the originating site at the patient's home and the distant site, [facility]/providers home in private room due to inclement weather.  Verbal consent to participate and interactive audio and video visit was obtained- was not able to connect to video feed so telephone call completed.      I discussed with the patient regarding the nature of our TelePhone visit, that:    -Our sessions are not being recorded and that personal health information is protected     -Provider would evaluate the patient and recommend diagnostics and treatments based on my assessment via TeleHealth/phone    -[facility] subspecialty rheumatology clinic we will provide follow-up care in person if and when the patient needs it.     - Patient in [facility] [place]     HPI:     Ena Mallory is a 44 y.o. female here today for a follow up of PsA.      Presents today for evaluation of PsA, referred by her PCP. Initially diagnosed with Psoriasis in 2002, after sinus surgery, she reports she was having a rash after her surgery and diagnosed after a bx at this time. She has not seen Derm in several years but most recently has been followed by Dr. Allen- last seen roughly 1 year ago. She reports he was managing her skin with topical agents but has never taken any oral or injection medications. She was given Gabapentin, Ultram and Mobic. She has been on Savella in the past as well for Fibro but could not afford to continue to take. She reports her last skin flare was roughly a few weeks ago, lower back and upper buttocks, states this is the first time her skin flares here- previously has been her feet/toes/knees and elbows- She was using Betamethasone topically and did help. She reports a lot of joint  "pain, mostly her hands/fingers, her knees (L>R)- has had previously arthroscopy on this knee but has continued pain and her left hip is also painful. She reports most recently she has been having more lower back pain, having a lot of pain "locking up" on her. She denies any back injury in the past. She cannot sleep on her back, has to sleep on her right side, occ wakes her up at times. She reports back pain worse with activity. 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 She has taken Ultram up to 4 times a day and is helpful, she states she only takes it once a week, only if she really needs it- not taking it qid.   She reports her knee will get red/warm/swollen at times, she also feels her hip will swell, hands and fingers will also swell. States left side is worse than right side. She does report dactylitis in her fingers and toes at times, last occurred a few months ago. Painful to wear shoes when occurs. She does have pain and swelling behind her ankles. Morning stiffness lasting for roughly 1-2 hours. She reports she was getting steroid injections in her left knee, did not help much, relief was very short lived and has not completed in several years. She was previously followed by Ortho in 2009 here at [facility] and has not seen since.   She reports skin sensitivity when she goes out in the sun- will get sun burned easily but not worsening of rashes, also reports eyes are very sensitive when she goes in the sun. She does report oral ulcers in the past, last occurred a few weeks ago, very painful and has a hard time eating, denies any vaginal or nasal ulcerations, does not occur often. She also has a history of GI issues, has a lot of reflux and nausea, possibly related to NSAID use, takes Prilosec and a Probiotic daily and this helps- is not followed by GI. Denies bloody stools but does have a lot of abd bloating and gas.      April 2024: Here today for follow up. " Received records from Dr. Allen- he was managing his Fibromyalgia, previous discussion in regards to oral meds for Psoriasis but never started.   Her toes will bother her off an on during the week- does report she had an episode of dactylitis since her last visit. She continues to have back pain, chronic, depends on her activity- just started PT. Morning stiffness lasting roughly 2 hours. She also suffers with headaches- she was previously followed by Neurology- last seen roughly 9 years ago, at this time they did discuss Biotox but she states she lost her insurance and was not able to follow back up- headaches several times a week (at least 2-3)- Excedrin Migraine does help- occ will have to take Ibuprofen after.  Does not have regular cycles anymore so not associated. Mother with history of Migraines.     July 2024: Here today for follow up. She has been taking MTX since April 2024- reports took all of May but did have a really bad sinus inufection in June and had to hold meds x 3 weks- she has resumed since and has tolerated well with no s/e. She denies noticing any red/warm/swollen joints, morning stiffness still lasting roughly 2 hours. She continues to have back pain- she is doing PT, has helped some- had to move to once a week due to family obligations with her son. Using Icy hot patches and does help. She denies any dactylitis at this time. She did have a rash on her hands/fingers/toes, states felt exactly like her feet- very itchy- lasted a few weeks- she was trying to keep her hands as moisturized as possible but with washing her hands frequently with work cotninued to dry out. She states she did have a slight rash to her lower back with rash but used topical and resolved quickly. She reports she is very tired, not sleeping well as her son is having issues and has not been sleeping therefore she has not been sleeping so feels a combination of symptoms contributing at this time. Migraines have continued-  she does have an apt with Neurology in December 2024. Denies any oral ulcers in a while.     October 2024: Here today for follow up. She has continued MTX 20 mg po qweek with folic acid 1 mg po qd and tolerating well. She denies red/warm/swollen joints, back has been most bothersome recently as she has been off her meds now for 2 weeks- battling sinus infection- plans to go into [facility] today to be seen. Morning stiffness lasting 30- 1 hour some days. She denies any new rashes, she states she felt like rash was coming out in gluteal cleft but started using the cream and helped and has not returned- she was referred to Derm back in July but still no apt- will check on status. She also had a toe infection on the left that resolved but states now occurring on right toe- denies any ingrown nails that she is aware of, did try to clip the skin on the side of the nail to help but now infected. She denies any dactylitis or enthesitis.     January 2025: Here today for follow up. Started on November 2024 Humira due to frequent infections with MTX- has been off now since November 2024- since off infections have decreased. She hs not started however as she never heard back from anyone in regards to meds being approved- appears approved back in November- she did not call to check on status- strongly enc to reach out if this occurs in the future and does not hear from anyone. She reports since being off of treatment, she has been having issues with rashes to left foot, back, but using creams on back has helped. She continues to have red/warm/swollen joints mainly to hands that comes and goes. She reports having dactylitis to fingers last week- hard time to grasp or close hand to make fist. She is out of prednisone do did not take-using Tylenol and Ibuprofen to help. Blood pressure is slightly low today, using a wrist cuff which are not always accurate- but reports has been suffering with sinus pressure and congestion, no fever- just  not feeling well with sinuses. She does admit to having sores in her mouth at this time, no hx of vaginal ulcers- she has been very stressed as her son having some issues right now and has been very difficult for her.     Denies any recent illness or hospitalizations since last visit.     Dr. Allen- Rheum in the past, last seen 1 year ago  [facility] Ex[facility] - est with [facility] next month  Dr. Arnold- Cardiology- previously followed by Dr. 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   Family history of autoimmune disease: None.   Pregnancies: 2 Miscarriages: None.   Smoking: Quit , hx of social smoker, started around late 20's- did not smoke daily     Social History     Tobacco Use   Smoking Status Former    Types: Cigarettes   Smokeless Tobacco Former        -------------------------------------    Allergy    Headache        Past Surgical History:   Procedure Laterality Date     SECTION      KNEE ARTHROSCOPY W/ ARTHROTOMY Left     SINUS SURGERY  2002    THYROIDECTOMY Left     Umbical Hernia Repair N/A 2017       Review of patient's allergies indicates:   Allergen Reactions    Sulfa (sulfonamide antibiotics) Hives    Sulfasalazine Other (See Comments)    Adhesive tape-silicones Dermatitis and Rash       Current Outpatient Medications   Medication Instructions    albuterol (PROVENTIL/VENTOLIN HFA) 90 mcg/actuation inhaler 2 puffs, Every 4-6 hours PRN    buPROPion (WELLBUTRIN SR) 150 mg, 2 times daily    clobetasoL (TEMOVATE) 0.05 % cream Topical (Top), 2 times daily    EMGALITY  mg, Subcutaneous, Every 28 days    famotidine (PEPCID) 40 mg, Daily    fluticasone propionate (FLONASE) 50 mcg/actuation nasal spray 1 spray, Daily    HUMIRA(CF) PEN 40 mg,  Subcutaneous, Every 14 days, Hold for fever or infections.    medroxyPROGESTERone (DEPO-PROVERA) 150 mg, Every 3 months    metoprolol tartrate (LOPRESSOR) 25 mg, Daily    mupirocin (BACTROBAN) 2 % ointment Topical (Top), 3 times daily    omeprazole (PRILOSEC) 40 mg, Daily    predniSONE (DELTASONE) 5 MG tablet Take 2 tab po qd x 3 days then take 1 tab po qd x 2 days prn PsA flare.    simvastatin (ZOCOR) 40 MG tablet 1 tablet, Nightly    traMADoL (ULTRAM) 50 mg, Every 6 hours    ubrogepant (UBRELVY) 100 mg, Oral, 2 times daily PRN       Social History     Socioeconomic History    Marital status:    Tobacco Use    Smoking status: Former     Types: Cigarettes    Smokeless tobacco: Former   Substance and Sexual Activity    Alcohol use: Not Currently     Comment: occ    Drug use: Not Currently    Sexual activity: Not Currently     Birth control/protection: Injection        Family History   Problem Relation Name Age of Onset    Chronic back pain Mother      Asthma Mother      Hypertension Father      Heart disease Father      Diabetes Mellitus Father      Diabetes Mellitus Brother      Hypertension Maternal Grandmother      Depression Maternal Grandmother      Cancer Maternal Grandmother      COPD Maternal Grandfather      Cancer Paternal Grandmother          Immunization History   Administered Date(s) Administered    COVID-19 Vaccine 03/12/2022    COVID-19, MRNA, LN-S, PF (MODERNA FULL 0.5 ML DOSE) 03/12/2022    Influenza - Quadrivalent - PF *Preferred* (6 months and older) 11/27/2013    Influenza - Trivalent - Fluarix, Flulaval, Fluzone, Afluria - PF 11/27/2013    Pneumococcal Conjugate - 7 Valent 11/27/2013    Pneumococcal Polysaccharide - 23 Valent 11/27/2013    Tdap 11/27/2013       Patient Care Team:  Davi Reyes MD as PCP - General (Family Medicine)     Subjective:     ROS    Constitutional:  Denies chills. Denies fever. Admits night sweats. Denies weight loss. Admits sleep disturbance.    Ophthalmology: Denies blurred vision. Denies diminished visual acuity. Admits dry eyes- uses gtts and helps- has f/u with [facility] next month. Denies eye pain. Admits Burning and redness- occurred in the past. Denies itching   ENT: Denies decreased hearing. Denies oral ulcers. Denies epistaxis. Denies swelling of ears or throat. Denies dry mouth. Denies swollen glands.   Endocrine: Denies diabetes. Admits thyroid Problems.   Respiratory: Denies cough. Denies shortness of breath. Denies shortness of breath with exertion. Denies hemoptysis.   Cardiovascular: Denies chest pain at rest. Denies chest pain with exertion. Denies Irregular heartbeat. Admits palpitations- hx of PACs- follows with Dr. Arnold. Denies edema. Denies orthopnea.   Gastrointestinal: Denies abdominal pain. Denies diarrhea. Denies nausea. Denies vomiting. Denies hematemesis or hematochezia. Denies change in appetite. Admits heartburn and bloating - continues to have issues- taking Probiotics but continues   Genitourinary: Denies dysuria. Denies urinary frequency. Denies urinary urgency. Denies blood in urine.  Musculoskeletal: See HPI for details  Integumentary: Admits rash. Denies Itching. Denies dry skin. Denies photosensitivity.   Peripheral Vascular: Denies Ulcers of hands and/or feet. Denies Cold extremities.   Neurologic: Denies dizziness. Admits headache- but much improved with new meds. Denies difficulty speaking. Denies loss of strength Denies numbness or tingling.   Psychiatric: Denies depression and anxiety- currently controlled with meds- has increased due to issues with son at this time. Denies suicidal/homicidal ideations.      Objective:     There were no vitals taken for this visit.  Clinically stable per Telephone  The rest of the exam is limited due to the nature of telephone visit     Physical Exam            Labs Reviewed:   1/5/2023 Vitamin D 20.1.CMP ok, CBC ok.     3/25/2024 Hep B Surface Ab Reactive, CMP ok. CBC ok.  "Hep B surface Ag, Core Ab/C/HIV all negative, CRP 4.50 (<5), Sed Rate 2 (<20). HLA B27 negative. SANTA negative. CCP negative. RF negative, Quantiferon negative     5/30/2024 CMP ok, CBC hgb 11, hct 34.6 (drop from previous), otherwise ok.     07/17/2024 CMP creatinine 1.06, GFR> 60, otherwise okay, CBC okay, CRP 1.30 (<5), ESR 1 (<20)    8/16/2024 CMP Creat 1.18 GFR 59, CBC stable- repeat CMP in 1 week since not coming in for another 4 weeks- needs to hydrate well     09/16/2024 CMP creatinine 1.12, BUN 20, GFR> 60    11/10/2024 CMP creatinine 1.18, BUN 19, otherwise okay and stable, CRP 2.3 (<5), ESR 5 (<20)    Rheumatology:  Lab Results   Component Value Date    ANAHS <1:80 (Negative) 03/25/2024    [id number] Negative 03/25/2024    INTER SEE COMMENTS 03/25/2024    SEDRATE 5 11/14/2024        Chemistry:  Lab Results   Component Value Date     11/14/2024    K 3.9 11/14/2024    BUN 19.4 (H) 11/14/2024    CREATININE 1.18 (H) 11/14/2024    EGFRNORACEVR 59 11/14/2024    GLUCOSE 93 11/14/2024    CALCIUM 9.7 11/14/2024    ALKPHOS 68 11/14/2024    LABPROT 7.3 11/14/2024    ALBUMIN 4.2 11/14/2024    AST 12 11/14/2024    ALT 12 11/14/2024        Hematology:  Lab Results   Component Value Date    WBC 6.03 11/14/2024    HGB 12.4 11/14/2024    HCT 38.6 11/14/2024     11/14/2024        Urine:  No results found for: "COLORUA", "APPEARANCEUA", "SGUA", "PHUA", "PROTEINUA", "GLUCOSEUA", "KETONESUA", "BLOODUA", "NITRITESUA", "LEUKOCYTESUR", "RBCUA", "WBCUA", "BACTERIA", "SQEPUA", "HYALINECASTS", "CREATRANDUR", "PROTEINURINE", "UPROTCREA"     No results found for: "PROTEINURINE", "CREATRANDUR", "UPROTCREA"     Imaging:   3/25/2024 CXRay: Impression: No radiographic evidence of an acute cardiopulmonary process.  Left and Right Foot Xray: Impression: No acute abnormalities are seen  Left and Right hand Xray: Impression: No acute abnormalities are seen  Left and Right Knee Xray: Impression:  No acute abnormalities are " seen  Left and Right Hip Xray: Impression: No acute osseous abnormality.  Lspine: FINDINGS: Degenerative changes are identified with narrowing and some sclerosis and marginal osteophytosis at L2-L3 some degenerative changes seen of the posterior elements at L4-L5 and L5-S1. Impression: Degenerative changes. No clear evidence of instability  SI Joint Xray: Impression:  No acute osseous abnormality.      Assessment:       ICD-10-CM ICD-9-CM   1. Psoriatic arthritis  L40.50 696.0   2. Psoriasis  L40.9 696.1   3. Drug-induced immunodeficiency  D84.821 279.3    Z79.899 E947.9   4. High risk medication use  Z79.899 V58.69   5. Chronic midline low back pain with left-sided sciatica  M54.42 724.2    G89.29 724.3     338.29   6. Fibromyalgia  M79.7 729.1   7. Primary hypertension  I10 401.9   8. Hypothyroidism, unspecified type  E03.9 244.9   9. Migraine with aura and without status migrainosus, not intractable  G43.109 346.00   10. Cervical pain  M54.2 723.1       Plan:     1. Psoriasis/Psoriatic arthritis  Diagnosed with Psoriasis in 2002 after skin biopsy- was developing rashes after sinus surgery and bx confirmed Psoriasis- she has not seen Derm in several years and was referred to Rheumatology Dr. Allen- has not seen in the past 1 year. She denies taking any meds for Psoriasis besides topicals, no orals or injections. She reports she was being managed with Ultram and Mobic. She gives a history of pain in her hands, knees, hips and toes with morning stiffness lasting 2 hours, also gives a history of dactylitis and enthesitis in the past. Last rash occurred a few weeks ago, first time occurred in lower back/upper buttocks area, previously on knees, feet and elbows- rash of feet noted on phone and Psoriatic in nature.  MTX d/c November 2024 due to frequent infections and started on Humira, however has not started as she reports not one reached out to her to let her know meds approved- advised if this occurs again to  always reach out to the office so we can check on status.   - Sent Rx for Humira 40 mg subq q14 days as directed- enc to start as directed- s/e have already been reviewed, however will reach out to Mayra Dunaway PharmD to call and review again as well  - On Depo with her GYN   - Infection w/u negative 4/2024  - Cxray ok 4/2024  - Has apt with Ophthalmology 2/2025  - Labs ordered today to be completed once weather conditions improve in 4 weeks after starting Humira.  - Has apt with Derm 4/25 and Ophthalmology 2/25  - Prednisone taper sent over to take as directed prn      2. High Risk Med use/Drug Induced Immunodeficency  - Persons with rheumatoid arthritis, lupus, psoriatic arthritis and other autoimmune diseases are at increased risk of cardiovascular disease including heart attack and stroke. We recommend that all patients with these conditions have annual health maintenance exams including lipid measurements, blood pressure measurements, and smoking cessation counseling when applicable at their primary care provider's office.  - Pap/Mammogram UTD with GYN  - Advised to stay up-to-date on age appropriate vaccinations and malignancy screening with PCP.   - Continued lab monitoring.   - Flu UTD     3.  Chronic midline low back pain with left-sided sciatica  - April 2024 Lspine: FINDINGS: Degenerative changes are identified with narrowing and some sclerosis and marginal osteophytosis at L2-L3 some degenerative changes seen of the posterior elements at L4-L5 and L5-S1. Impression: Degenerative changes. No clear evidence of instability  - Enc stretches, started PT and tolerating well, has found helpful- 8/5/2024- transitioned to independent phase of rehab    4. Fibromyalgia  - No issues noted at this time   - She was advised that we do no manage Fibro here, that we will continue to defer treatment to PCP     5. Hypothyroidism/HTN/GERD  - Followed and managed by PCP, currently low today, has not been feeling well- advised to  stay hydrated and if worsens advised to report to ER for evaluation, enc low Na+ diet  - Diet modifications and enc to cut back on use of NSAIDs   - Will make referral to GI as she continues to have issues with reflux, bloating, constipation, flatulence and belching with epigastric bloating- will check stool for h pylori- Referral sent, advised if she does not hear from anyone in the next 2-3 weeks in regards to apt, enc to reach out to our office so we can check the status of referral.    7. Migraines  - Migraines for several years- was previously followed by Neurology (cannot recall name)- discussed possible Botox but lost her insurance and was not able to follow up- has been managing with Excedrin Migraine and Motrin/Aleve- reports light sensitivity, has to lay down in dark room with no lights, nausea, occ vomiting and dizziness associated with headaches- occurs roughly 2-3 times a week- OTC meds do help some days- would like referral back to Neurology- possibly to help decrease use of NSAIDs with treatment  - 12/04/2024 seen by Neurology Dr. Thorne: Started on Galcanezumab 120 mg once per month - will avoid topiramate given history of renal stones, and depakote due to potential for weight gain. Have failed metoprolol and venlafaxine. start Ubrogepant 100 mg twice a day as needed: triptans are contraindicated in patient with cardiac arrhythmia and hypertension. RTC in 4 months     8. Elevated serum Creatinine  - Monitoring closely with MTX use however she is taking Excedrin Migraine often, this is most likely also contributing- she does have an upcoming apt with Neuro- enc to use Tylenol prn and try to avoid NSAIDs- continue to hydrate well with H20   - Repeat ordered today, now off MTX    9. Cervical Pain  - Will obtain Cspine Xrays for eval- ordered October 2024 but has not completed- she will come by and have completed with 4 week lab work    10. Paronychia Right Great toe  - Treated with Doxycycline 100 mg p.o.  b.i.d. x 10 days and  Mupirocin topical t.i.d. times 10 days in October 2024  - Resolved after use of antibiotics and has not returned.     No follow-ups on file. In addition to their scheduled follow up, the patient has also been instructed to follow up on as needed basis.     Total time spent with patient and documentation is 40 minutes. All questions were answered to patient's satisfaction and patient verbalized understanding. This includes face to face time (no face to face time completed due to not being able to connect to video feed- only telephone encounter) and non-face to face time preparing to see the patient (eg, review of tests), obtaining and/or reviewing separately obtained history, documenting clinical information in the electronic or other health record, independently interpreting results and communicating results to the patient/family/caregiver, or care coordinator.      CHAUNCEY Oliveros    No orders of the defined types were placed in this encounter.

## 2025-01-23 ENCOUNTER — TELEPHONE (OUTPATIENT)
Dept: RHEUMATOLOGY | Facility: CLINIC | Age: 45
End: 2025-01-23
Payer: MEDICAID

## 2025-01-23 ENCOUNTER — OFFICE VISIT (OUTPATIENT)
Dept: RHEUMATOLOGY | Facility: CLINIC | Age: 45
End: 2025-01-23
Payer: MEDICAID

## 2025-01-23 VITALS — SYSTOLIC BLOOD PRESSURE: 86 MMHG | HEART RATE: 68 BPM | DIASTOLIC BLOOD PRESSURE: 58 MMHG

## 2025-01-23 DIAGNOSIS — G89.29 CHRONIC MIDLINE LOW BACK PAIN WITH LEFT-SIDED SCIATICA: ICD-10-CM

## 2025-01-23 DIAGNOSIS — M54.42 CHRONIC MIDLINE LOW BACK PAIN WITH LEFT-SIDED SCIATICA: ICD-10-CM

## 2025-01-23 DIAGNOSIS — I10 PRIMARY HYPERTENSION: ICD-10-CM

## 2025-01-23 DIAGNOSIS — G43.109 MIGRAINE WITH AURA AND WITHOUT STATUS MIGRAINOSUS, NOT INTRACTABLE: ICD-10-CM

## 2025-01-23 DIAGNOSIS — L40.50 PSORIATIC ARTHRITIS: Primary | ICD-10-CM

## 2025-01-23 DIAGNOSIS — D84.821 DRUG-INDUCED IMMUNODEFICIENCY: ICD-10-CM

## 2025-01-23 DIAGNOSIS — R14.3 FLATULENCE: ICD-10-CM

## 2025-01-23 DIAGNOSIS — L40.9 PSORIASIS: ICD-10-CM

## 2025-01-23 DIAGNOSIS — E03.9 HYPOTHYROIDISM, UNSPECIFIED TYPE: ICD-10-CM

## 2025-01-23 DIAGNOSIS — M79.7 FIBROMYALGIA: ICD-10-CM

## 2025-01-23 DIAGNOSIS — M54.2 CERVICAL PAIN: ICD-10-CM

## 2025-01-23 DIAGNOSIS — R14.0 ABDOMINAL BLOATING: ICD-10-CM

## 2025-01-23 DIAGNOSIS — Z79.899 DRUG-INDUCED IMMUNODEFICIENCY: ICD-10-CM

## 2025-01-23 DIAGNOSIS — Z79.899 HIGH RISK MEDICATION USE: ICD-10-CM

## 2025-01-23 RX ORDER — ADALIMUMAB 40MG/0.4ML
40 KIT SUBCUTANEOUS
Qty: 2 PEN | Refills: 11 | Status: SHIPPED | OUTPATIENT
Start: 2025-01-23 | End: 2025-04-17

## 2025-01-23 RX ORDER — PREDNISONE 5 MG/1
TABLET ORAL
Qty: 30 TABLET | Refills: 0 | Status: SHIPPED | OUTPATIENT
Start: 2025-01-23

## 2025-01-23 RX ORDER — PREDNISONE 5 MG/1
TABLET ORAL
Qty: 30 TABLET | Refills: 0 | Status: SHIPPED | OUTPATIENT
Start: 2025-01-23 | End: 2025-01-23

## 2025-01-23 NOTE — TELEPHONE ENCOUNTER
Please place a reminder to call for lab work in 4 weeks after starting Humira- also please schedule f/u OV in 3 months with NP- referral to GI also placed at visit 1/23/25

## 2025-01-23 NOTE — TELEPHONE ENCOUNTER
Please call Ena to review Humira again- she has not started as she said no one ever reached out to let her know her meds were approved- appears has been approved since November- she never called to follow up- advised to call in the future so she is not off of meds for this long- thanks!

## 2025-01-24 ENCOUNTER — PATIENT MESSAGE (OUTPATIENT)
Dept: RHEUMATOLOGY | Facility: CLINIC | Age: 45
End: 2025-01-24
Payer: MEDICAID

## 2025-01-24 NOTE — TELEPHONE ENCOUNTER
Reminder placed for week of 02.24.25 to repeat lab work 4 weeks after starting Humira.  Communicated to patient as well.

## 2025-01-27 NOTE — TELEPHONE ENCOUNTER
Notified patient that Humira is ready and to contact [facility] if there is any delays.

## 2025-02-24 ENCOUNTER — TELEPHONE (OUTPATIENT)
Dept: RHEUMATOLOGY | Facility: CLINIC | Age: 45
End: 2025-02-24
Payer: MEDICAID

## 2025-02-24 NOTE — TELEPHONE ENCOUNTER
----- Message from Nurse Steele sent at 1/24/2025  8:49 AM CST -----  Regarding: lab reminder  Please place a reminder to call for lab work in 4 weeks after starting Humira- ----per Courtney (01.23.25)

## 2025-02-27 ENCOUNTER — RESULTS FOLLOW-UP (OUTPATIENT)
Dept: RHEUMATOLOGY | Facility: CLINIC | Age: 45
End: 2025-02-27
Payer: MEDICAID

## 2025-02-27 ENCOUNTER — HOSPITAL ENCOUNTER (OUTPATIENT)
Dept: RADIOLOGY | Facility: HOSPITAL | Age: 45
Discharge: HOME | End: 2025-02-27
Attending: NURSE PRACTITIONER
Payer: MEDICAID

## 2025-02-27 DIAGNOSIS — M54.2 CERVICAL PAIN: ICD-10-CM

## 2025-02-27 DIAGNOSIS — G43.109 MIGRAINE WITH AURA AND WITHOUT STATUS MIGRAINOSUS, NOT INTRACTABLE: ICD-10-CM

## 2025-02-27 PROCEDURE — 72052 X-RAY EXAM NECK SPINE 6/>VWS: CPT | Mod: TC

## 2025-02-28 ENCOUNTER — RESULTS FOLLOW-UP (OUTPATIENT)
Dept: RHEUMATOLOGY | Facility: CLINIC | Age: 45
End: 2025-02-28
Payer: MEDICAID

## 2025-02-28 DIAGNOSIS — L40.50 PSORIATIC ARTHRITIS: ICD-10-CM

## 2025-02-28 DIAGNOSIS — M48.02 CERVICAL STENOSIS OF SPINAL CANAL: ICD-10-CM

## 2025-02-28 DIAGNOSIS — M48.02 SPINAL STENOSIS, CERVICAL REGION: Primary | ICD-10-CM

## 2025-02-28 DIAGNOSIS — M53.2X2 CERVICAL SPINE INSTABILITY: ICD-10-CM

## 2025-02-28 NOTE — TELEPHONE ENCOUNTER
Spoke to pt informed of x-ray results. Informed Someone fro radiology will be call her to schedule MRI, verbalized understanding

## 2025-02-28 NOTE — TELEPHONE ENCOUNTER
----- Message from CHAUNCEY Oliveros sent at 2/28/2025  7:53 AM CST -----  Please advise the patient that does show some degenerative changes along with some narrowing motion noted with flexion and extension- will send for further evaluation with MRI- deborah let her know   Radiology should be reaching out to schedule her    ----- Message -----  From: Interface, Rad Results In  Sent: 2/27/2025   5:10 PM CST  To: CHAUNCEY Oliveros